# Patient Record
Sex: FEMALE | Race: BLACK OR AFRICAN AMERICAN | NOT HISPANIC OR LATINO
[De-identification: names, ages, dates, MRNs, and addresses within clinical notes are randomized per-mention and may not be internally consistent; named-entity substitution may affect disease eponyms.]

---

## 2021-09-09 PROBLEM — Z00.00 ENCOUNTER FOR PREVENTIVE HEALTH EXAMINATION: Status: ACTIVE | Noted: 2021-09-09

## 2021-09-10 ENCOUNTER — APPOINTMENT (OUTPATIENT)
Dept: VASCULAR SURGERY | Facility: CLINIC | Age: 76
End: 2021-09-10
Payer: MEDICARE

## 2021-09-10 VITALS
HEART RATE: 71 BPM | SYSTOLIC BLOOD PRESSURE: 157 MMHG | WEIGHT: 176 LBS | HEIGHT: 63 IN | DIASTOLIC BLOOD PRESSURE: 75 MMHG | BODY MASS INDEX: 31.18 KG/M2

## 2021-09-10 DIAGNOSIS — I10 ESSENTIAL (PRIMARY) HYPERTENSION: ICD-10-CM

## 2021-09-10 DIAGNOSIS — J45.909 UNSPECIFIED ASTHMA, UNCOMPLICATED: ICD-10-CM

## 2021-09-10 DIAGNOSIS — I82.409 ACUTE EMBOLISM AND THROMBOSIS OF UNSPECIFIED DEEP VEINS OF UNSPECIFIED LOWER EXTREMITY: ICD-10-CM

## 2021-09-10 DIAGNOSIS — Z78.9 OTHER SPECIFIED HEALTH STATUS: ICD-10-CM

## 2021-09-10 DIAGNOSIS — E11.9 TYPE 2 DIABETES MELLITUS W/OUT COMPLICATIONS: ICD-10-CM

## 2021-09-10 DIAGNOSIS — Z80.9 FAMILY HISTORY OF MALIGNANT NEOPLASM, UNSPECIFIED: ICD-10-CM

## 2021-09-10 PROCEDURE — 99204 OFFICE O/P NEW MOD 45 MIN: CPT

## 2021-09-10 NOTE — CONSULT LETTER
[Dear  ___] : Dear  [unfilled], [Consult Letter:] : I had the pleasure of evaluating your patient, [unfilled]. [Please see my note below.] : Please see my note below. [Consult Closing:] : Thank you very much for allowing me to participate in the care of this patient.  If you have any questions, please do not hesitate to contact me. [Sincerely,] : Sincerely, [FreeTextEntry3] : Gabino Del Rosario M.D., F.CASTILLOS., R.P.JANINE.I.\par  of Vascular Surgery\par Assistant Professor of Radiology\par Director of Endovascular Program/ [facility]\par [facility]

## 2021-09-10 NOTE — ASSESSMENT
[FreeTextEntry1] : Patient with left lower extremity DVT being treated with anticoagulation.  Venous duplex from last week shows left popliteal and femoral DVT.  Continue anticoagulation for 6 to 12 months.  We will follow-up in 6 months with repeat duplex.  Recommend compression stockings and elevation to treat edema.\par \par I spoke to the patient regarding screening for malignancy and follow-up for mammography.

## 2021-09-10 NOTE — HISTORY OF PRESENT ILLNESS
[FreeTextEntry1] : Patient is a 76-year-old female with past medical history significant for hypertension who in June 2021 was diagnosed with left lower extremity extensive DVT with pulmonary embolism.  Patient was admitted to the hospital and was treated with anticoagulation.  Patient was discharged home on Eliquis.  Patient is here for follow-up.  No complaint of chest pain or shortness of breath.  Patient reports left lower extremity edema.  Patient has no history of malignancy or smoking and is not on birth control pills.\par \par Patient was traveling at the time and the episode was immediately after returning to [place].  Patient's last mammogram was 2 years ago and she had a colonoscopy 2 years ago also.

## 2021-09-10 NOTE — PHYSICAL EXAM
[JVD] : no jugular venous distention  [Normal Breath Sounds] : Normal breath sounds [Normal Heart Sounds] : normal heart sounds [2+] : left 2+ [Ankle Swelling (On Exam)] : present [Ankle Swelling On The Left] : moderate [Abdomen Masses] : No abdominal masses

## 2021-12-17 ENCOUNTER — APPOINTMENT (OUTPATIENT)
Dept: VASCULAR SURGERY | Facility: CLINIC | Age: 76
End: 2021-12-17
Payer: MEDICARE

## 2021-12-17 VITALS
BODY MASS INDEX: 31.18 KG/M2 | WEIGHT: 176 LBS | DIASTOLIC BLOOD PRESSURE: 82 MMHG | SYSTOLIC BLOOD PRESSURE: 159 MMHG | HEART RATE: 79 BPM | HEIGHT: 63 IN

## 2021-12-17 VITALS — TEMPERATURE: 97.2 F

## 2021-12-17 PROCEDURE — 93970 EXTREMITY STUDY: CPT

## 2021-12-17 PROCEDURE — 99214 OFFICE O/P EST MOD 30 MIN: CPT

## 2021-12-17 NOTE — HISTORY OF PRESENT ILLNESS
[FreeTextEntry1] : Patient is a 76-year-old female with past medical history significant for hypertension who in June 2021 was diagnosed with left lower extremity extensive DVT with pulmonary embolism.  Patient was admitted to the hospital and was treated with anticoagulation.  Patient was discharged home on Eliquis.  Patient is here for follow-up.  No complaint of chest pain or shortness of breath.  Patient reports left lower extremity edema.  Patient has no history of malignancy or smoking and is not on birth control pills.\par \par

## 2021-12-17 NOTE — ASSESSMENT
[FreeTextEntry1] : Patient with left lower extremity DVT being treated with anticoagulation.  \par \par No complaint of chest pain or shortness of breath.  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  This was discussed with the patient.  Follow-up in 6 months.

## 2022-01-11 ENCOUNTER — RESULT REVIEW (OUTPATIENT)
Age: 77
End: 2022-01-11

## 2022-06-03 ENCOUNTER — APPOINTMENT (OUTPATIENT)
Dept: VASCULAR SURGERY | Facility: CLINIC | Age: 77
End: 2022-06-03
Payer: MEDICARE

## 2022-06-03 PROCEDURE — 99214 OFFICE O/P EST MOD 30 MIN: CPT

## 2022-06-03 PROCEDURE — 93971 EXTREMITY STUDY: CPT

## 2022-06-03 RX ORDER — METFORMIN HYDROCHLORIDE 500 MG/1
500 TABLET, COATED ORAL
Qty: 180 | Refills: 0 | Status: ACTIVE | COMMUNITY
Start: 2021-12-16

## 2022-06-03 RX ORDER — APIXABAN 5 MG/1
5 TABLET, FILM COATED ORAL
Qty: 180 | Refills: 0 | Status: ACTIVE | COMMUNITY
Start: 2021-07-02

## 2022-06-03 RX ORDER — LOSARTAN POTASSIUM 100 MG/1
100 TABLET, FILM COATED ORAL
Qty: 90 | Refills: 0 | Status: ACTIVE | COMMUNITY
Start: 2021-12-16

## 2022-06-03 RX ORDER — AMLODIPINE BESYLATE 10 MG/1
10 TABLET ORAL
Qty: 90 | Refills: 0 | Status: ACTIVE | COMMUNITY
Start: 2021-12-16

## 2022-06-03 RX ORDER — GLIMEPIRIDE 2 MG/1
2 TABLET ORAL
Qty: 90 | Refills: 0 | Status: ACTIVE | COMMUNITY
Start: 2021-12-16

## 2022-06-03 RX ORDER — CHLORTHALIDONE 25 MG/1
25 TABLET ORAL
Qty: 90 | Refills: 0 | Status: ACTIVE | COMMUNITY
Start: 2022-03-31

## 2022-06-03 RX ORDER — ATORVASTATIN CALCIUM 40 MG/1
40 TABLET, FILM COATED ORAL
Qty: 90 | Refills: 0 | Status: ACTIVE | COMMUNITY
Start: 2021-12-16

## 2022-06-03 RX ORDER — ALENDRONATE SODIUM 70 MG/1
70 TABLET ORAL
Qty: 12 | Refills: 0 | Status: ACTIVE | COMMUNITY
Start: 2021-12-16

## 2022-06-03 RX ORDER — BLOOD-GLUCOSE METER
W/DEVICE KIT MISCELLANEOUS
Qty: 1 | Refills: 0 | Status: ACTIVE | COMMUNITY
Start: 2022-03-03

## 2022-06-03 RX ORDER — LANCETS 28 GAUGE
EACH MISCELLANEOUS
Qty: 200 | Refills: 0 | Status: ACTIVE | COMMUNITY
Start: 2022-03-03

## 2022-06-03 RX ORDER — BLOOD SUGAR DIAGNOSTIC
STRIP MISCELLANEOUS
Qty: 200 | Refills: 0 | Status: ACTIVE | COMMUNITY
Start: 2022-03-03

## 2022-06-03 RX ORDER — EZETIMIBE 10 MG/1
10 TABLET ORAL
Qty: 90 | Refills: 0 | Status: ACTIVE | COMMUNITY
Start: 2021-12-16

## 2022-06-03 NOTE — ASSESSMENT
[FreeTextEntry1] : Patient with left lower extremity DVT being treated with anticoagulation.  \par \par No complaint of chest pain or shortness of breath.  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  At this time all the thrombus in the left leg is chronic with recanalization.  Patient should continue with Eliquis until he is further evaluated by the hematology service.\par \par Follow-up as needed.

## 2023-04-14 ENCOUNTER — INPATIENT (INPATIENT)
Facility: HOSPITAL | Age: 78
LOS: 3 days | Discharge: HOME | DRG: 690 | End: 2023-04-18
Attending: STUDENT IN AN ORGANIZED HEALTH CARE EDUCATION/TRAINING PROGRAM | Admitting: STUDENT IN AN ORGANIZED HEALTH CARE EDUCATION/TRAINING PROGRAM
Payer: COMMERCIAL

## 2023-04-14 VITALS
TEMPERATURE: 98 F | HEART RATE: 83 BPM | SYSTOLIC BLOOD PRESSURE: 147 MMHG | RESPIRATION RATE: 17 BRPM | WEIGHT: 173.06 LBS | DIASTOLIC BLOOD PRESSURE: 72 MMHG | HEIGHT: 63 IN | OXYGEN SATURATION: 96 %

## 2023-04-14 DIAGNOSIS — Z29.9 ENCOUNTER FOR PROPHYLACTIC MEASURES, UNSPECIFIED: ICD-10-CM

## 2023-04-14 DIAGNOSIS — E87.6 HYPOKALEMIA: ICD-10-CM

## 2023-04-14 DIAGNOSIS — N39.0 URINARY TRACT INFECTION, SITE NOT SPECIFIED: ICD-10-CM

## 2023-04-14 DIAGNOSIS — E11.9 TYPE 2 DIABETES MELLITUS WITHOUT COMPLICATIONS: ICD-10-CM

## 2023-04-14 DIAGNOSIS — Z98.891 HISTORY OF UTERINE SCAR FROM PREVIOUS SURGERY: Chronic | ICD-10-CM

## 2023-04-14 DIAGNOSIS — E78.5 HYPERLIPIDEMIA, UNSPECIFIED: ICD-10-CM

## 2023-04-14 DIAGNOSIS — Z86.718 PERSONAL HISTORY OF OTHER VENOUS THROMBOSIS AND EMBOLISM: ICD-10-CM

## 2023-04-14 DIAGNOSIS — I10 ESSENTIAL (PRIMARY) HYPERTENSION: ICD-10-CM

## 2023-04-14 LAB
ALBUMIN SERPL ELPH-MCNC: 3.7 G/DL — SIGNIFICANT CHANGE UP (ref 3.5–5)
ALP SERPL-CCNC: 68 U/L — SIGNIFICANT CHANGE UP (ref 40–120)
ALT FLD-CCNC: 17 U/L DA — SIGNIFICANT CHANGE UP (ref 10–60)
ANION GAP SERPL CALC-SCNC: 4 MMOL/L — LOW (ref 5–17)
APPEARANCE UR: CLEAR — SIGNIFICANT CHANGE UP
APTT BLD: 38.7 SEC — HIGH (ref 27.5–35.5)
AST SERPL-CCNC: 19 U/L — SIGNIFICANT CHANGE UP (ref 10–40)
BACTERIA # UR AUTO: ABNORMAL /HPF
BASOPHILS # BLD AUTO: 0.04 K/UL — SIGNIFICANT CHANGE UP (ref 0–0.2)
BASOPHILS NFR BLD AUTO: 0.8 % — SIGNIFICANT CHANGE UP (ref 0–2)
BILIRUB SERPL-MCNC: 0.9 MG/DL — SIGNIFICANT CHANGE UP (ref 0.2–1.2)
BILIRUB UR-MCNC: NEGATIVE — SIGNIFICANT CHANGE UP
BUN SERPL-MCNC: 11 MG/DL — SIGNIFICANT CHANGE UP (ref 7–18)
CALCIUM SERPL-MCNC: 9.8 MG/DL — SIGNIFICANT CHANGE UP (ref 8.4–10.5)
CHLORIDE SERPL-SCNC: 100 MMOL/L — SIGNIFICANT CHANGE UP (ref 96–108)
CO2 SERPL-SCNC: 31 MMOL/L — SIGNIFICANT CHANGE UP (ref 22–31)
COLOR SPEC: YELLOW — SIGNIFICANT CHANGE UP
CREAT SERPL-MCNC: 0.83 MG/DL — SIGNIFICANT CHANGE UP (ref 0.5–1.3)
DIFF PNL FLD: ABNORMAL
EGFR: 72 ML/MIN/1.73M2 — SIGNIFICANT CHANGE UP
EOSINOPHIL # BLD AUTO: 0.06 K/UL — SIGNIFICANT CHANGE UP (ref 0–0.5)
EOSINOPHIL NFR BLD AUTO: 1.2 % — SIGNIFICANT CHANGE UP (ref 0–6)
EPI CELLS # UR: ABNORMAL /HPF
GLUCOSE BLDC GLUCOMTR-MCNC: 185 MG/DL — HIGH (ref 70–99)
GLUCOSE SERPL-MCNC: 113 MG/DL — HIGH (ref 70–99)
GLUCOSE UR QL: NEGATIVE — SIGNIFICANT CHANGE UP
HCT VFR BLD CALC: 43.3 % — SIGNIFICANT CHANGE UP (ref 34.5–45)
HGB BLD-MCNC: 13.3 G/DL — SIGNIFICANT CHANGE UP (ref 11.5–15.5)
IMM GRANULOCYTES NFR BLD AUTO: 0.2 % — SIGNIFICANT CHANGE UP (ref 0–0.9)
INR BLD: 1.44 RATIO — HIGH (ref 0.88–1.16)
KETONES UR-MCNC: ABNORMAL
LEUKOCYTE ESTERASE UR-ACNC: ABNORMAL
LYMPHOCYTES # BLD AUTO: 1.75 K/UL — SIGNIFICANT CHANGE UP (ref 1–3.3)
LYMPHOCYTES # BLD AUTO: 34.8 % — SIGNIFICANT CHANGE UP (ref 13–44)
MCHC RBC-ENTMCNC: 24.7 PG — LOW (ref 27–34)
MCHC RBC-ENTMCNC: 30.7 GM/DL — LOW (ref 32–36)
MCV RBC AUTO: 80.5 FL — SIGNIFICANT CHANGE UP (ref 80–100)
MONOCYTES # BLD AUTO: 0.3 K/UL — SIGNIFICANT CHANGE UP (ref 0–0.9)
MONOCYTES NFR BLD AUTO: 6 % — SIGNIFICANT CHANGE UP (ref 2–14)
NEUTROPHILS # BLD AUTO: 2.87 K/UL — SIGNIFICANT CHANGE UP (ref 1.8–7.4)
NEUTROPHILS NFR BLD AUTO: 57 % — SIGNIFICANT CHANGE UP (ref 43–77)
NITRITE UR-MCNC: NEGATIVE — SIGNIFICANT CHANGE UP
NRBC # BLD: 0 /100 WBCS — SIGNIFICANT CHANGE UP (ref 0–0)
PH UR: 8 — SIGNIFICANT CHANGE UP (ref 5–8)
PLATELET # BLD AUTO: 361 K/UL — SIGNIFICANT CHANGE UP (ref 150–400)
POTASSIUM SERPL-MCNC: 3.4 MMOL/L — LOW (ref 3.5–5.3)
POTASSIUM SERPL-SCNC: 3.4 MMOL/L — LOW (ref 3.5–5.3)
PROT SERPL-MCNC: 8.9 G/DL — HIGH (ref 6–8.3)
PROT UR-MCNC: 15 MG/DL
PROTHROM AB SERPL-ACNC: 17.2 SEC — HIGH (ref 10.5–13.4)
RBC # BLD: 5.38 M/UL — HIGH (ref 3.8–5.2)
RBC # FLD: 13 % — SIGNIFICANT CHANGE UP (ref 10.3–14.5)
RBC CASTS # UR COMP ASSIST: ABNORMAL /HPF (ref 0–2)
SARS-COV-2 RNA SPEC QL NAA+PROBE: SIGNIFICANT CHANGE UP
SODIUM SERPL-SCNC: 135 MMOL/L — SIGNIFICANT CHANGE UP (ref 135–145)
SP GR SPEC: 1.01 — SIGNIFICANT CHANGE UP (ref 1.01–1.02)
UROBILINOGEN FLD QL: 1 MG/DL
WBC # BLD: 5.03 K/UL — SIGNIFICANT CHANGE UP (ref 3.8–10.5)
WBC # FLD AUTO: 5.03 K/UL — SIGNIFICANT CHANGE UP (ref 3.8–10.5)
WBC UR QL: SIGNIFICANT CHANGE UP /HPF (ref 0–5)

## 2023-04-14 PROCEDURE — 99285 EMERGENCY DEPT VISIT HI MDM: CPT

## 2023-04-14 PROCEDURE — 99223 1ST HOSP IP/OBS HIGH 75: CPT | Mod: GC

## 2023-04-14 RX ORDER — APIXABAN 2.5 MG/1
1 TABLET, FILM COATED ORAL
Refills: 0 | DISCHARGE

## 2023-04-14 RX ORDER — ATORVASTATIN CALCIUM 80 MG/1
40 TABLET, FILM COATED ORAL AT BEDTIME
Refills: 0 | Status: DISCONTINUED | OUTPATIENT
Start: 2023-04-14 | End: 2023-04-18

## 2023-04-14 RX ORDER — MEROPENEM 1 G/30ML
1000 INJECTION INTRAVENOUS ONCE
Refills: 0 | Status: COMPLETED | OUTPATIENT
Start: 2023-04-14 | End: 2023-04-14

## 2023-04-14 RX ORDER — NITROFURANTOIN MACROCRYSTAL 50 MG
1 CAPSULE ORAL
Refills: 0 | DISCHARGE

## 2023-04-14 RX ORDER — EZETIMIBE 10 MG/1
1 TABLET ORAL
Refills: 0 | DISCHARGE

## 2023-04-14 RX ORDER — APIXABAN 2.5 MG/1
5 TABLET, FILM COATED ORAL EVERY 12 HOURS
Refills: 0 | Status: DISCONTINUED | OUTPATIENT
Start: 2023-04-15 | End: 2023-04-18

## 2023-04-14 RX ORDER — AMLODIPINE BESYLATE 2.5 MG/1
1 TABLET ORAL
Refills: 0 | DISCHARGE

## 2023-04-14 RX ORDER — LOSARTAN POTASSIUM 100 MG/1
100 TABLET, FILM COATED ORAL DAILY
Refills: 0 | Status: DISCONTINUED | OUTPATIENT
Start: 2023-04-14 | End: 2023-04-18

## 2023-04-14 RX ORDER — MEROPENEM 1 G/30ML
1000 INJECTION INTRAVENOUS EVERY 8 HOURS
Refills: 0 | Status: DISCONTINUED | OUTPATIENT
Start: 2023-04-15 | End: 2023-04-15

## 2023-04-14 RX ORDER — AMLODIPINE BESYLATE 2.5 MG/1
10 TABLET ORAL DAILY
Refills: 0 | Status: DISCONTINUED | OUTPATIENT
Start: 2023-04-14 | End: 2023-04-18

## 2023-04-14 RX ORDER — GLIMEPIRIDE 1 MG
1 TABLET ORAL
Refills: 0 | DISCHARGE

## 2023-04-14 RX ORDER — LOSARTAN POTASSIUM 100 MG/1
1 TABLET, FILM COATED ORAL
Refills: 0 | DISCHARGE

## 2023-04-14 RX ORDER — ACETAMINOPHEN 500 MG
650 TABLET ORAL EVERY 6 HOURS
Refills: 0 | Status: DISCONTINUED | OUTPATIENT
Start: 2023-04-14 | End: 2023-04-18

## 2023-04-14 RX ORDER — ATORVASTATIN CALCIUM 80 MG/1
1 TABLET, FILM COATED ORAL
Refills: 0 | DISCHARGE

## 2023-04-14 RX ORDER — METFORMIN HYDROCHLORIDE 850 MG/1
1 TABLET ORAL
Refills: 0 | DISCHARGE

## 2023-04-14 RX ORDER — POTASSIUM CHLORIDE 20 MEQ
40 PACKET (EA) ORAL ONCE
Refills: 0 | Status: COMPLETED | OUTPATIENT
Start: 2023-04-14 | End: 2023-04-14

## 2023-04-14 RX ORDER — INSULIN LISPRO 100/ML
VIAL (ML) SUBCUTANEOUS
Refills: 0 | Status: DISCONTINUED | OUTPATIENT
Start: 2023-04-14 | End: 2023-04-18

## 2023-04-14 RX ORDER — MEROPENEM 1 G/30ML
INJECTION INTRAVENOUS
Refills: 0 | Status: DISCONTINUED | OUTPATIENT
Start: 2023-04-14 | End: 2023-04-15

## 2023-04-14 RX ORDER — ENOXAPARIN SODIUM 100 MG/ML
40 INJECTION SUBCUTANEOUS EVERY 24 HOURS
Refills: 0 | Status: DISCONTINUED | OUTPATIENT
Start: 2023-04-14 | End: 2023-04-14

## 2023-04-14 RX ADMIN — ATORVASTATIN CALCIUM 40 MILLIGRAM(S): 80 TABLET, FILM COATED ORAL at 22:13

## 2023-04-14 RX ADMIN — Medication 40 MILLIEQUIVALENT(S): at 23:02

## 2023-04-14 RX ADMIN — MEROPENEM 100 MILLIGRAM(S): 1 INJECTION INTRAVENOUS at 18:34

## 2023-04-14 RX ADMIN — Medication 1: at 22:13

## 2023-04-14 NOTE — H&P ADULT - ASSESSMENT
Patient is a 77 yo female with hx of HTN, DM, PE/DVT (in 2020 2/2 COVID vaccine, on Eliquis), HLD presents with dysuria. Patient sent from Dr. Faiza Higgins's office due to abnormal culture results (does not have it at bedside). Upon evaluation in ED, patient afebrile and hemodynamically stable. Patient found to have left flank pain on examination. Labs showing no leukocytosis, UA noted with no WBCs Potassium of 3.4. EKG NSR. Patient admitted to medicine for UTI.

## 2023-04-14 NOTE — H&P ADULT - ATTENDING COMMENTS
History as noted above.    Patient with chronic dysuria for several months and recently diagnosed with UTI on Macrobid. She was recalled today for resistant UTI, though the results were not brought in with her. We have no idea what to treat her with or what she was given, but per ED attending was told to give carbapenem by the office? She follows with Faiza Higgins. Patient otherwise appearing non-toxic.    VSS    Labs personally reviewed  No leukocytosis  No pyuria  Normal renal function    For now we have no choice but to continue meropenem in case she has ESBL. I will attempt to obtain urine culture results. If outpatient provider does want to treat for UTI plan for 7 day course and likely will require midline. Ertapenem would be easier for dosing. Given overall presentation may have to consider chronic pelvic pain/dysuria as possibility.

## 2023-04-14 NOTE — ED PROVIDER NOTE - OBJECTIVE STATEMENT
79 yo F with pmhx of dm, htn presents with few days of dysuria. No fever, chills, abd pain, n/v, vaginal itching/discharge. States she was seen by her obgyn Dr. Faiza Higgins - who started her on nitrofurantin. She received a call today in the after and was told to come to ED to have her abx changed based on cx. Pt does not have ucx results with her however. Attempted to reach out to her physician  -

## 2023-04-14 NOTE — ED PROVIDER NOTE - PROGRESS NOTE DETAILS
received a call from ob team - pt tested positive for kleb pneumia that is only susceptible to  meropenem.   primary care physician Dr. Matilde Ruano

## 2023-04-14 NOTE — H&P ADULT - NSICDXPASTMEDICALHX_GEN_ALL_CORE_FT
PAST MEDICAL HISTORY:  DM (diabetes mellitus)     DVT, lower extremity     HLD (hyperlipidemia)     HTN (hypertension)     Pulmonary embolism

## 2023-04-14 NOTE — H&P ADULT - HISTORY OF PRESENT ILLNESS
Patient is a 77 yo female with hx of HTN, DM, PE/DVT (in 2020 2/2 COVID vaccine, on Eliquis), HLD presents with dysuria. Patient states that she has been having dysuria since last November, when she returned to her home [place] [facility]. Patient states that the dysuria was intermittent, but started to become worse on April 6. This last Thursday, patient went to see Dr. Faiza Higgins and at the time was told she had a urinary tract infection. Patient was given Nitrofurantoin for ten days. Patient then received call from office stating that she has to come in due to urine culture results. Patient does not have urine culture results or has no knowledge of them. Patient denies fevers or chills. Denies abdominal pain, changes to bowel habits, nausea or vomiting. Denies chest pain, sob or lower extremity edema. Patient does report some left flank pain,

## 2023-04-14 NOTE — H&P ADULT - NSHPPHYSICALEXAM_GEN_ALL_CORE
PHYSICAL EXAM:  GENERAL: NAD, speaks in full sentences, no signs of respiratory distress  HEAD:  Atraumatic, Normocephalic  EYES: EOMI, PERRLA, conjunctiva and sclera clear  NECK: Supple,   CHEST/LUNG: Clear to auscultation bilaterally; No wheeze; No crackles; No accessory muscles used  HEART: Regular rate and rhythm; No murmurs; gallops or rubs   ABDOMEN: Soft, Nontender, Nondistended; Bowel sounds present; No guarding  EXTREMITIES:  2+ Peripheral Pulses, No cyanosis or edema  BACK: Left flank pain noted   PSYCH: AAOx3  NEUROLOGY: non-focal  SKIN: No rashes or lesions Vital Signs Last 24 Hrs  T(C): 36.8 (14 Apr 2023 23:29), Max: 36.9 (14 Apr 2023 15:59)  T(F): 98.2 (14 Apr 2023 23:29), Max: 98.4 (14 Apr 2023 15:59)  HR: 63 (14 Apr 2023 23:29) (63 - 83)  BP: 126/69 (14 Apr 2023 23:29) (126/69 - 147/72)  BP(mean): 88 (14 Apr 2023 23:29) (88 - 88)  RR: 18 (14 Apr 2023 23:29) (17 - 18)  SpO2: 96% (14 Apr 2023 23:29) (96% - 97%)    Parameters below as of 14 Apr 2023 23:29  Patient On (Oxygen Delivery Method): room air    PHYSICAL EXAM:  GENERAL: NAD, speaks in full sentences, no signs of respiratory distress  HEAD:  Atraumatic, Normocephalic  EYES: EOMI, PERRLA, conjunctiva and sclera clear  NECK: Supple,   CHEST/LUNG: Clear to auscultation bilaterally; No wheeze; No crackles; No accessory muscles used  HEART: Regular rate and rhythm; No murmurs; gallops or rubs   ABDOMEN: Soft, Nontender, Nondistended; Bowel sounds present; No guarding  EXTREMITIES:  2+ Peripheral Pulses, No cyanosis or edema  BACK: Left flank pain noted   PSYCH: AAOx3  NEUROLOGY: non-focal  SKIN: No rashes or lesions

## 2023-04-14 NOTE — H&P ADULT - NSHPREVIEWOFSYSTEMS_GEN_ALL_CORE
CONSTITUTIONAL: No changes in appetite or generalized weakness.  No fever, weight loss, or fatigue  EYES: No eye pain, visual disturbances, or discharge  ENT:  No difficulty hearing, tinnitus, vertigo; No sinus or throat pain  NECK: No pain or stiffness  RESPIRATORY: No cough, wheezing, chills or hemoptysis; No Shortness of Breath  CARDIOVASCULAR: No chest pain, palpitations, passing out, dizziness, or leg swelling  GASTROINTESTINAL: No abdominal or epigastric pain. No nausea, vomiting, or hematemesis; No diarrhea or constipation. No melena or hematochezia.  GENITOURINARY: Has dysuria, No frequency, hematuria, or incontinence  NEUROLOGICAL: No headaches, memory loss, loss of strength, numbness, or tremors  SKIN: NO skin lesions   LYMPH Nodes: No enlarged glands  ENDOCRINE: No heat or cold intolerance; No hair loss  MUSCULOSKELETAL: No joint pain or swelling; Has left flank pain  PSYCHIATRIC: No depression, anxiety, mood swings, or difficulty sleeping  HEME/LYMPH: No easy bruising, or bleeding gums  ALLERGY AND IMMUNOLOGIC: No hives or eczema

## 2023-04-14 NOTE — ED ADULT NURSE NOTE - OBJECTIVE STATEMENT
77 yo female sitting on a chair presents to the ED for medical evaluation for abnormal urine laboratory result as advised by OBGYN doctor.

## 2023-04-14 NOTE — ED PROVIDER NOTE - CLINICAL SUMMARY MEDICAL DECISION MAKING FREE TEXT BOX
79 yo F with pmhx of dm, htn presents with few days of dysuria. Concern for UTI - pt is already being treated with nitrofurantoin. Unable to reach her physician. No concern for pyelonephritis at this time. Will send urine culture and treat with cefuroxime 77 yo F with pmhx of dm, htn presents with few days of dysuria. Concern for UTI - pt is already being treated with nitrofurantoin. Unable to reach her physician.    d/w hospitalist agrees with admission for uti with kleb pneu that is only susceptible to meropenem

## 2023-04-14 NOTE — H&P ADULT - PROBLEM SELECTOR PLAN 1
- Patient presenting from outpatient provider for abnormal urine culture results requiring IV antibiotics  - symptoms of dysuria, flank pain on left side   - admit to medicine  - no leukocytosis, no WBCs on UA   - continue with meropenem (until culture results are obtained) for ESBL  - f/u urine and blood cultures

## 2023-04-15 LAB
ALBUMIN SERPL ELPH-MCNC: 3.2 G/DL — LOW (ref 3.5–5)
ALP SERPL-CCNC: 60 U/L — SIGNIFICANT CHANGE UP (ref 40–120)
ALT FLD-CCNC: 15 U/L DA — SIGNIFICANT CHANGE UP (ref 10–60)
ANION GAP SERPL CALC-SCNC: 5 MMOL/L — SIGNIFICANT CHANGE UP (ref 5–17)
AST SERPL-CCNC: 17 U/L — SIGNIFICANT CHANGE UP (ref 10–40)
BILIRUB SERPL-MCNC: 0.8 MG/DL — SIGNIFICANT CHANGE UP (ref 0.2–1.2)
BUN SERPL-MCNC: 11 MG/DL — SIGNIFICANT CHANGE UP (ref 7–18)
CALCIUM SERPL-MCNC: 9.2 MG/DL — SIGNIFICANT CHANGE UP (ref 8.4–10.5)
CHLORIDE SERPL-SCNC: 101 MMOL/L — SIGNIFICANT CHANGE UP (ref 96–108)
CO2 SERPL-SCNC: 32 MMOL/L — HIGH (ref 22–31)
CREAT SERPL-MCNC: 0.76 MG/DL — SIGNIFICANT CHANGE UP (ref 0.5–1.3)
CULTURE RESULTS: NO GROWTH — SIGNIFICANT CHANGE UP
EGFR: 80 ML/MIN/1.73M2 — SIGNIFICANT CHANGE UP
GLUCOSE BLDC GLUCOMTR-MCNC: 117 MG/DL — HIGH (ref 70–99)
GLUCOSE BLDC GLUCOMTR-MCNC: 119 MG/DL — HIGH (ref 70–99)
GLUCOSE BLDC GLUCOMTR-MCNC: 120 MG/DL — HIGH (ref 70–99)
GLUCOSE SERPL-MCNC: 100 MG/DL — HIGH (ref 70–99)
HCT VFR BLD CALC: 40.3 % — SIGNIFICANT CHANGE UP (ref 34.5–45)
HGB BLD-MCNC: 12.5 G/DL — SIGNIFICANT CHANGE UP (ref 11.5–15.5)
MAGNESIUM SERPL-MCNC: 2.3 MG/DL — SIGNIFICANT CHANGE UP (ref 1.6–2.6)
MCHC RBC-ENTMCNC: 25.1 PG — LOW (ref 27–34)
MCHC RBC-ENTMCNC: 31 GM/DL — LOW (ref 32–36)
MCV RBC AUTO: 80.8 FL — SIGNIFICANT CHANGE UP (ref 80–100)
NRBC # BLD: 0 /100 WBCS — SIGNIFICANT CHANGE UP (ref 0–0)
PHOSPHATE SERPL-MCNC: 3.3 MG/DL — SIGNIFICANT CHANGE UP (ref 2.5–4.5)
PLATELET # BLD AUTO: 331 K/UL — SIGNIFICANT CHANGE UP (ref 150–400)
POTASSIUM SERPL-MCNC: 3.8 MMOL/L — SIGNIFICANT CHANGE UP (ref 3.5–5.3)
POTASSIUM SERPL-SCNC: 3.8 MMOL/L — SIGNIFICANT CHANGE UP (ref 3.5–5.3)
PROT SERPL-MCNC: 7.9 G/DL — SIGNIFICANT CHANGE UP (ref 6–8.3)
RBC # BLD: 4.99 M/UL — SIGNIFICANT CHANGE UP (ref 3.8–5.2)
RBC # FLD: 13.2 % — SIGNIFICANT CHANGE UP (ref 10.3–14.5)
SODIUM SERPL-SCNC: 138 MMOL/L — SIGNIFICANT CHANGE UP (ref 135–145)
SPECIMEN SOURCE: SIGNIFICANT CHANGE UP
WBC # BLD: 4.63 K/UL — SIGNIFICANT CHANGE UP (ref 3.8–10.5)
WBC # FLD AUTO: 4.63 K/UL — SIGNIFICANT CHANGE UP (ref 3.8–10.5)

## 2023-04-15 PROCEDURE — 99232 SBSQ HOSP IP/OBS MODERATE 35: CPT

## 2023-04-15 PROCEDURE — 74176 CT ABD & PELVIS W/O CONTRAST: CPT | Mod: 26

## 2023-04-15 RX ORDER — ERTAPENEM SODIUM 1 G/1
1000 INJECTION, POWDER, LYOPHILIZED, FOR SOLUTION INTRAMUSCULAR; INTRAVENOUS EVERY 24 HOURS
Refills: 0 | Status: ACTIVE | OUTPATIENT
Start: 2023-04-16 | End: 2023-04-18

## 2023-04-15 RX ORDER — MEROPENEM 1 G/30ML
1000 INJECTION INTRAVENOUS EVERY 8 HOURS
Refills: 0 | Status: COMPLETED | OUTPATIENT
Start: 2023-04-15 | End: 2023-04-15

## 2023-04-15 RX ADMIN — MEROPENEM 100 MILLIGRAM(S): 1 INJECTION INTRAVENOUS at 21:21

## 2023-04-15 RX ADMIN — AMLODIPINE BESYLATE 10 MILLIGRAM(S): 2.5 TABLET ORAL at 05:34

## 2023-04-15 RX ADMIN — APIXABAN 5 MILLIGRAM(S): 2.5 TABLET, FILM COATED ORAL at 17:20

## 2023-04-15 RX ADMIN — MEROPENEM 100 MILLIGRAM(S): 1 INJECTION INTRAVENOUS at 05:33

## 2023-04-15 RX ADMIN — APIXABAN 5 MILLIGRAM(S): 2.5 TABLET, FILM COATED ORAL at 05:34

## 2023-04-15 RX ADMIN — ATORVASTATIN CALCIUM 40 MILLIGRAM(S): 80 TABLET, FILM COATED ORAL at 21:21

## 2023-04-15 RX ADMIN — LOSARTAN POTASSIUM 100 MILLIGRAM(S): 100 TABLET, FILM COATED ORAL at 05:34

## 2023-04-15 RX ADMIN — MEROPENEM 100 MILLIGRAM(S): 1 INJECTION INTRAVENOUS at 13:37

## 2023-04-15 NOTE — PATIENT PROFILE ADULT - FALL HARM RISK - HARM RISK INTERVENTIONS

## 2023-04-15 NOTE — CHART NOTE - NSCHARTNOTEFT_GEN_A_CORE
Urine Culture from outpatient 4/11 reviewed.    100K CFU of Klebsiella Pneumoniae    Likely ESBL and essentially sensitive to carbapenems (meropenem listed)    Will switch to Ertapenem for dosing ease. Plan for 5 day course for uncomplicated cystitis unless there is something abnormal on CT scan

## 2023-04-16 LAB
ALBUMIN SERPL ELPH-MCNC: 2.6 G/DL — LOW (ref 3.5–5)
ALP SERPL-CCNC: 49 U/L — SIGNIFICANT CHANGE UP (ref 40–120)
ALT FLD-CCNC: 13 U/L DA — SIGNIFICANT CHANGE UP (ref 10–60)
ANION GAP SERPL CALC-SCNC: 6 MMOL/L — SIGNIFICANT CHANGE UP (ref 5–17)
AST SERPL-CCNC: 12 U/L — SIGNIFICANT CHANGE UP (ref 10–40)
BILIRUB SERPL-MCNC: 0.7 MG/DL — SIGNIFICANT CHANGE UP (ref 0.2–1.2)
BUN SERPL-MCNC: 11 MG/DL — SIGNIFICANT CHANGE UP (ref 7–18)
CALCIUM SERPL-MCNC: 9.1 MG/DL — SIGNIFICANT CHANGE UP (ref 8.4–10.5)
CHLORIDE SERPL-SCNC: 105 MMOL/L — SIGNIFICANT CHANGE UP (ref 96–108)
CO2 SERPL-SCNC: 30 MMOL/L — SIGNIFICANT CHANGE UP (ref 22–31)
CREAT SERPL-MCNC: 0.72 MG/DL — SIGNIFICANT CHANGE UP (ref 0.5–1.3)
EGFR: 85 ML/MIN/1.73M2 — SIGNIFICANT CHANGE UP
GLUCOSE BLDC GLUCOMTR-MCNC: 108 MG/DL — HIGH (ref 70–99)
GLUCOSE BLDC GLUCOMTR-MCNC: 111 MG/DL — HIGH (ref 70–99)
GLUCOSE BLDC GLUCOMTR-MCNC: 122 MG/DL — HIGH (ref 70–99)
GLUCOSE BLDC GLUCOMTR-MCNC: 134 MG/DL — HIGH (ref 70–99)
GLUCOSE SERPL-MCNC: 112 MG/DL — HIGH (ref 70–99)
HCT VFR BLD CALC: 36.8 % — SIGNIFICANT CHANGE UP (ref 34.5–45)
HGB BLD-MCNC: 11.4 G/DL — LOW (ref 11.5–15.5)
MAGNESIUM SERPL-MCNC: 2.2 MG/DL — SIGNIFICANT CHANGE UP (ref 1.6–2.6)
MCHC RBC-ENTMCNC: 24.7 PG — LOW (ref 27–34)
MCHC RBC-ENTMCNC: 31 GM/DL — LOW (ref 32–36)
MCV RBC AUTO: 79.8 FL — LOW (ref 80–100)
NRBC # BLD: 0 /100 WBCS — SIGNIFICANT CHANGE UP (ref 0–0)
PHOSPHATE SERPL-MCNC: 3.2 MG/DL — SIGNIFICANT CHANGE UP (ref 2.5–4.5)
PLATELET # BLD AUTO: 274 K/UL — SIGNIFICANT CHANGE UP (ref 150–400)
POTASSIUM SERPL-MCNC: 3.4 MMOL/L — LOW (ref 3.5–5.3)
POTASSIUM SERPL-SCNC: 3.4 MMOL/L — LOW (ref 3.5–5.3)
PROT SERPL-MCNC: 6.6 G/DL — SIGNIFICANT CHANGE UP (ref 6–8.3)
RBC # BLD: 4.61 M/UL — SIGNIFICANT CHANGE UP (ref 3.8–5.2)
RBC # FLD: 13.2 % — SIGNIFICANT CHANGE UP (ref 10.3–14.5)
SODIUM SERPL-SCNC: 141 MMOL/L — SIGNIFICANT CHANGE UP (ref 135–145)
WBC # BLD: 3.73 K/UL — LOW (ref 3.8–10.5)
WBC # FLD AUTO: 3.73 K/UL — LOW (ref 3.8–10.5)

## 2023-04-16 PROCEDURE — 99232 SBSQ HOSP IP/OBS MODERATE 35: CPT | Mod: GC

## 2023-04-16 RX ORDER — POTASSIUM CHLORIDE 20 MEQ
40 PACKET (EA) ORAL ONCE
Refills: 0 | Status: COMPLETED | OUTPATIENT
Start: 2023-04-16 | End: 2023-04-16

## 2023-04-16 RX ADMIN — ATORVASTATIN CALCIUM 40 MILLIGRAM(S): 80 TABLET, FILM COATED ORAL at 21:48

## 2023-04-16 RX ADMIN — ERTAPENEM SODIUM 120 MILLIGRAM(S): 1 INJECTION, POWDER, LYOPHILIZED, FOR SOLUTION INTRAMUSCULAR; INTRAVENOUS at 05:21

## 2023-04-16 RX ADMIN — LOSARTAN POTASSIUM 100 MILLIGRAM(S): 100 TABLET, FILM COATED ORAL at 05:20

## 2023-04-16 RX ADMIN — AMLODIPINE BESYLATE 10 MILLIGRAM(S): 2.5 TABLET ORAL at 05:21

## 2023-04-16 RX ADMIN — APIXABAN 5 MILLIGRAM(S): 2.5 TABLET, FILM COATED ORAL at 17:50

## 2023-04-16 RX ADMIN — Medication 40 MILLIEQUIVALENT(S): at 08:37

## 2023-04-16 RX ADMIN — APIXABAN 5 MILLIGRAM(S): 2.5 TABLET, FILM COATED ORAL at 05:20

## 2023-04-16 NOTE — PROGRESS NOTE ADULT - NSPROGADDITIONALINFOA_GEN_ALL_CORE
A/P: HD#2 admitted for multidrug resistant urinary tract infection; urine culture 100K CFU of Klebsiella Pneumonia  -cont medicine mgmt, abx treatment Ertapenem x 5days  -urology consult outpatient recommended  -no active GYN management at this time, follow up with GYN outpatient, Dr Higgins in 1 week from discharge    Case and nst d/w Dr Higgins

## 2023-04-17 LAB
GLUCOSE BLDC GLUCOMTR-MCNC: 112 MG/DL — HIGH (ref 70–99)
GLUCOSE BLDC GLUCOMTR-MCNC: 124 MG/DL — HIGH (ref 70–99)
GLUCOSE BLDC GLUCOMTR-MCNC: 124 MG/DL — HIGH (ref 70–99)
GLUCOSE BLDC GLUCOMTR-MCNC: 97 MG/DL — SIGNIFICANT CHANGE UP (ref 70–99)

## 2023-04-17 PROCEDURE — 99232 SBSQ HOSP IP/OBS MODERATE 35: CPT

## 2023-04-17 RX ADMIN — APIXABAN 5 MILLIGRAM(S): 2.5 TABLET, FILM COATED ORAL at 17:03

## 2023-04-17 RX ADMIN — ATORVASTATIN CALCIUM 40 MILLIGRAM(S): 80 TABLET, FILM COATED ORAL at 21:02

## 2023-04-17 RX ADMIN — ERTAPENEM SODIUM 120 MILLIGRAM(S): 1 INJECTION, POWDER, LYOPHILIZED, FOR SOLUTION INTRAMUSCULAR; INTRAVENOUS at 05:17

## 2023-04-17 RX ADMIN — APIXABAN 5 MILLIGRAM(S): 2.5 TABLET, FILM COATED ORAL at 05:16

## 2023-04-17 NOTE — PROGRESS NOTE ADULT - ASSESSMENT
A/P: HD#2 admitted for multidrug resistant urinary tract infection; urine culture 100K CFU of Klebsiella Pneumonia  -cont medicine mgmt, abx treatment Ertapenem x 5days  -urology consult outpatient recommended  -no active GYN management at this time, follow up with GYN outpatient, Dr Higgins in 1 week from discharge    Case and nst d/w Dr Higgins 
A: HD#2 admitted   pt was seen by dr small-YUMIKO MAXWELL yesterday in her office and was instructed to come to the hospital for admission for multidrug resistant urinary tract infection and only sensitive to iv abx    P:  dr staci henley md requesting ID/urology consult   -ID for the duration of the iv abx   -urology due to pt has hx of kidney stone and recurrent UTI's  -thank you very much 
Patient is a 79 yo female with hx of HTN, DM, PE/DVT (in 2020 2/2 COVID vaccine, on Eliquis), HLD presents with dysuria. Patient states that she has been having dysuria since last November, when she returned to her home country [facility]. Patient states that the dysuria was intermittent, but started to become worse on April 6. This last Thursday, patient went to see Dr. Faiza Higgins and at the time was told she had a urinary tract infection. Patient was given Nitrofurantoin for ten days. Patient was instructed to come to the hospital for admission for multidrug resistant urinary tract infection. Patient admitted to medicine for UTI and was started on Meropenem. U. cx negative, Meropenem was changed to Invanz last dose 04/18.  
Patient is a 79 yo female with hx of HTN, DM, PE/DVT (in 2020 2/2 COVID vaccine, on Eliquis), HLD presents with dysuria. Patient sent from Dr. Faiza Higgins's office due to abnormal culture results (does not have it at bedside) requiring IV antibiotics.
Patient is a 77 yo female with hx of HTN, DM, PE/DVT (in 2020 2/2 COVID vaccine, on Eliquis), HLD presents with dysuria. Patient sent from Dr. Faiza Higgins's office due to abnormal culture results (does not have it at bedside) requiring IV antibiotics.

## 2023-04-17 NOTE — PROGRESS NOTE ADULT - PROBLEM SELECTOR PLAN 4
continue with home medications of losartan and amlodipine  - DASH diet.
- noted to be on Glimepiride and Metformin  - SSI
- noted to be on Glimepiride and Metformin  - SSI

## 2023-04-17 NOTE — PROGRESS NOTE ADULT - PROBLEM SELECTOR PLAN 1
Patient presenting from outpatient provider for abnormal urine culture results requiring IV antibiotics  - symptoms of dysuria, flank pain on left side   - admit to medicine  - no leukocytosis, no WBCs on UA   - continue with meropenem (until culture results are obtained) for ESBL  - urine and blood cultures negative.  - Meropenem changed to Invanz till 04/18
A/P: HD#2 admitted for multidrug resistant urinary tract infection; urine culture 100K CFU of Klebsiella Pneumonia  -cont medicine mgmt, abx treatment Ertapenem x 5days  -urology consult outpatient recommended  -no active GYN management at this time, follow up with GYN outpatient, Dr Higgins in 1 week from discharge    Case and nst d/w Dr Higgins
- Patient presenting from outpatient provider for abnormal urine culture results requiring IV antibiotics  - UCx reviewed: Klebsiella Pneumoniae likely ESBL (sensitivities reviewed)  - Plan for total treatment of 5 days for cystitis, Ertapenem 1 g daily (finishing treatment 4/18)  - blood & urine cultures NGTD  - CT [facility] [place] performed: no nephrolithiasis and likely stool ball seen  - Outpatient follow up with Faiza Higgins 1 week post discharge
- Patient presenting from outpatient provider for abnormal urine culture results requiring IV antibiotics  - Will try to obtain the culture results from [facility]. Discussed with GYN PA about the need to review the actual results  - For now will provide Meropenem  - Plan for total 5-7 day course  - Follow up blood & urine cultures  - Given prior history of nephrolithiasis will perform CT [facility]

## 2023-04-17 NOTE — PROGRESS NOTE ADULT - SUBJECTIVE AND OBJECTIVE BOX
S:  HD#2 admitted   pt was seen by dr small-GYN MD yesterday in her office and was instructed to come to the hospital for admission for multidrug resistant urinary tract infection    pt seen at bedside   still reports of dysuria when she urinates and urgency  denies abd pain or back pain  denies fever/chills    pt has hx of kidney stone in november and as per pt she was given medication that helped    O:  Vital Signs Last 24 Hrs  T(C): 37.1 (15 Apr 2023 06:28), Max: 37.1 (15 Apr 2023 06:28)  T(F): 98.7 (15 Apr 2023 06:28), Max: 98.7 (15 Apr 2023 06:28)  HR: 62 (15 Apr 2023 06:28) (62 - 83)  BP: 129/54 (15 Apr 2023 06:28) (126/69 - 148/78)  BP(mean): 62 (15 Apr 2023 05:23) (62 - 88)  RR: 16 (15 Apr 2023 06:28) (16 - 18)  SpO2: 97% (15 Apr 2023 06:28) (96% - 97%)    Parameters below as of 15 Apr 2023 06:28  Patient On (Oxygen Delivery Method): room air    pt skin warm dry good color  abd soft NT ND no guarding no rebound  no cvat b/l  pelvic no vag bleeding  extrem no edema b/l                          12.5   4.63  )-----------( 331      ( 15 Apr 2023 05:40 )             40.3   04-15    138  |  101  |  11  ----------------------------<  100<H>  3.8   |  32<H>  |  0.76    Ca    9.2      15 Apr 2023 05:40  Phos  3.3     04-15  Mg     2.3     04-15    TPro  7.9  /  Alb  3.2<L>  /  TBili  0.8  /  DBili  x   /  AST  17  /  ALT  15  /  AlkPhos  60  04-15    
Patient is a 78y old  Female who presents with a chief complaint of UTI (15 Apr 2023 11:38)      SUBJECTIVE / OVERNIGHT EVENTS: Patient seen and examined at bedside. No acute events overnight. Dysuria improving. Denies fever/chills or hematuria.    MEDICATIONS  (STANDING):  amLODIPine   Tablet 10 milliGRAM(s) Oral daily  apixaban 5 milliGRAM(s) Oral every 12 hours  atorvastatin 40 milliGRAM(s) Oral at bedtime  insulin lispro (ADMELOG) corrective regimen sliding scale   SubCutaneous three times a day before meals  losartan 100 milliGRAM(s) Oral daily  meropenem  IVPB 1000 milliGRAM(s) IV Intermittent every 8 hours    MEDICATIONS  (PRN):  acetaminophen     Tablet .. 650 milliGRAM(s) Oral every 6 hours PRN Temp greater or equal to 38C (100.4F), Mild Pain (1 - 3)      CAPILLARY BLOOD GLUCOSE      POCT Blood Glucose.: 120 mg/dL (15 Apr 2023 12:00)  POCT Blood Glucose.: 117 mg/dL (15 Apr 2023 08:06)  POCT Blood Glucose.: 185 mg/dL (2023 22:04)    I&O's Summary      PHYSICAL EXAM:  Vital Signs Last 24 Hrs  T(C): 37.1 (15 Apr 2023 06:28), Max: 37.1 (15 Apr 2023 06:28)  T(F): 98.7 (15 Apr 2023 06:28), Max: 98.7 (15 Apr 2023 06:28)  HR: 62 (15 Apr 2023 06:28) (62 - 83)  BP: 129/54 (15 Apr 2023 06:28) (126/69 - 148/78)  BP(mean): 62 (15 Apr 2023 05:23) (62 - 88)  RR: 16 (15 Apr 2023 06:28) (16 - 18)  SpO2: 97% (15 Apr 2023 06:28) (96% - 97%)    Parameters below as of 15 Apr 2023 06:28  Patient On (Oxygen Delivery Method): room air        GEN: female in NAD, appears comfortable, no diaphoresis  EYES: No scleral injection, PERRL, EOMI  ENTM: neck supple & symmetric without tracheal deviation, moist membranes, no gross hearing impairment, thyroid gland not enlarged  CV: +S1/S2, no m/r/g, no abdominal bruit, no LE edema  RESP: breathing comfortably, no respiratory accessory muscle use, CTAB, no w/r/r  GI: normoactive BS, soft, NTND, no rebounding/guarding, no palpable masses    LABS:                        12.5   4.63  )-----------( 331      ( 15 Apr 2023 05:40 )             40.3     04-15    138  |  101  |  11  ----------------------------<  100<H>  3.8   |  32<H>  |  0.76    Ca    9.2      15 Apr 2023 05:40  Phos  3.3     04-15  Mg     2.3     04-15    TPro  7.9  /  Alb  3.2<L>  /  TBili  0.8  /  DBili  x   /  AST  17  /  ALT  15  /  AlkPhos  60  04-15    PT/INR - ( 2023 18:10 )   PT: 17.2 sec;   INR: 1.44 ratio         PTT - ( 2023 18:10 )  PTT:38.7 sec      Urinalysis Basic - ( 2023 17:07 )    Color: Yellow / Appearance: Clear / S.015 / pH: x  Gluc: x / Ketone: Small  / Bili: Negative / Urobili: 1 mg/dL   Blood: x / Protein: 15 mg/dL / Nitrite: Negative   Leuk Esterase: Trace / RBC: 2-5 /HPF / WBC 0-2 /HPF   Sq Epi: x / Non Sq Epi: x / Bacteria: Few /HPF          RADIOLOGY & ADDITIONAL TESTS:  Results Reviewed:   Imaging Personally Reviewed:  Electrocardiogram Personally Reviewed:    COORDINATION OF CARE:  Care Discussed with Consultants/Other Providers [Y/N]:  Prior or Outpatient Records Reviewed [Y/N]:  
Patient seen at bedside resting comfortably offers no new complaints. Ambulating, voiding without difficulty. Denies N/V/D, dizziness, fever or chills.     Vital Signs Last 24 Hrs  T(C): 37 (16 Apr 2023 05:08), Max: 37.6 (15 Apr 2023 14:00)  T(F): 98.6 (16 Apr 2023 05:08), Max: 99.6 (15 Apr 2023 14:00)  HR: 61 (16 Apr 2023 05:08) (61 - 69)  BP: 133/99 (16 Apr 2023 05:08) (118/63 - 133/99)  RR: 19 (16 Apr 2023 05:08) (18 - 19)  SpO2: 95% (16 Apr 2023 05:08) (95% - 99%)    Parameters below as of 16 Apr 2023 05:08  Patient On (Oxygen Delivery Method): room air        Gen: A&O x 3, NAD  Chest: CTA B/L  Abdomen: +BS; soft; Nontender, nondistended                            11.4   3.73  )-----------( 274      ( 16 Apr 2023 07:31 )             36.8     04-16    141  |  105  |  11  ----------------------------<  112<H>  3.4<L>   |  30  |  0.72    Ca    9.1      16 Apr 2023 07:31  Phos  3.2     04-16  Mg     2.2     04-16    TPro  6.6  /  Alb  2.6<L>  /  TBili  0.7  /  DBili  x   /  AST  12  /  ALT  13  /  AlkPhos  49  04-16    < from: CT Renal Stone Hun (04.15.23 @ 13:27) >  PROCEDURE DATE:  04/15/2023          INTERPRETATION:  CLINICAL INFORMATION: Nephrolithiasis.    COMPARISON: None.    CONTRAST/COMPLICATIONS:  IV Contrast: NONE  Oral Contrast: NONE  Complications: None reported at time of study completion    PROCEDURE:  CT of the Abdomen and Pelvis was performed.  Sagittal and coronal reformats were performed.    FINDINGS:  LOWER CHEST: Small left atelectasis.    LIVER: Within normal limits.  BILE DUCTS: Normal caliber.  GALLBLADDER: Underdistended.  SPLEEN: Within normal limits.  PANCREAS: Atrophic.  ADRENALS: Nonspecific 1.0 cm left adrenal nodule with a Hounsfield unit   of 25. The right adrenal appears unremarkable.  KIDNEYS/URETERS: Within normal limits. No evidence for a calculus in the   kidneys or ureters. No hydronephrosis.    BLADDER: Within normal limits.  REPRODUCTIVE ORGANS: The uterus and left adnexa appear grossly   unremarkable. 1.3 cm cystic lesion in the right adnexa,suggestive of a   right ovarian cyst.    BOWEL: No bowel obstruction or grossly thickened bowel wall. Appendix   within normal limits. 2.7 cm soft tissue density structure in the cecum   may represent a stool ball, polyp, or cancer. If clinically indicated,   colonoscopy may be pursued for further evaluation. Moderate amount of   stool in the rectum.  PERITONEUM: No free air or ascites.  VESSELS: Calcified atherosclerotic disease.  RETROPERITONEUM/LYMPH NODES: No lymphadenopathy.  ABDOMINAL WALL:Small fat-containing umbilical hernia. Small   fat-containing left inguinal hernia.  BONES: Degenerative spondylosis.    IMPRESSION:  No evidence for a urinary calculus. No hydronephrosis.    2.7 cm soft tissue density structure in the cecum may represent a stool   ball, polyp, or cancer. If clinically indicated, colonoscopy may be   pursued for further evaluation.    --- End of Report ---            ARMANDO GAYTAN MD; Attending Radiologist  This document has been electronically signed. Apr 15 78071:53PM    < end of copied text >      A/P: HD#2 admitted for multidrug resistant urinary tract infection; urine culture 100K CFU of Klebsiella Pneumonia  -cont medicine mgmt, abx treatment Ertapenem x 5days  -urology consult outpatient recommended  -no active GYN management at this time, follow up with GYN outpatient, Dr Higgins in 1 week from discharge    Case and nst d/w Dr Higgins   
Patient is a 78y old  Female who presents with a chief complaint of UTI (2023 09:27)      SUBJECTIVE / OVERNIGHT EVENTS: Patient seen and examined at bedside. No acute events overnight. No dysuria/fever.    MEDICATIONS  (STANDING):  amLODIPine   Tablet 10 milliGRAM(s) Oral daily  apixaban 5 milliGRAM(s) Oral every 12 hours  atorvastatin 40 milliGRAM(s) Oral at bedtime  ertapenem  IVPB 1000 milliGRAM(s) IV Intermittent every 24 hours  insulin lispro (ADMELOG) corrective regimen sliding scale   SubCutaneous three times a day before meals  losartan 100 milliGRAM(s) Oral daily    MEDICATIONS  (PRN):  acetaminophen     Tablet .. 650 milliGRAM(s) Oral every 6 hours PRN Temp greater or equal to 38C (100.4F), Mild Pain (1 - 3)      CAPILLARY BLOOD GLUCOSE      POCT Blood Glucose.: 122 mg/dL (2023 11:43)  POCT Blood Glucose.: 108 mg/dL (2023 07:52)  POCT Blood Glucose.: 119 mg/dL (15 Apr 2023 16:55)  POCT Blood Glucose.: 120 mg/dL (15 Apr 2023 12:00)    I&O's Summary      PHYSICAL EXAM:  Vital Signs Last 24 Hrs  T(C): 37 (2023 05:08), Max: 37.6 (15 Apr 2023 14:00)  T(F): 98.6 (2023 05:08), Max: 99.6 (15 Apr 2023 14:00)  HR: 61 (2023 05:08) (61 - 69)  BP: 133/99 (2023 05:08) (118/63 - 133/99)  BP(mean): --  RR: 19 (2023 05:08) (18 - 19)  SpO2: 95% (2023 05:08) (95% - 99%)    Parameters below as of 2023 05:08  Patient On (Oxygen Delivery Method): room air        GEN: female in NAD, appears comfortable, no diaphoresis  EYES: No scleral injection, PERRL, EOMI  ENTM: neck supple & symmetric without tracheal deviation, moist membranes, no gross hearing impairment, thyroid gland not enlarged  CV: +S1/S2, no m/r/g, no abdominal bruit, no LE edema  RESP: breathing comfortably, no respiratory accessory muscle use, CTAB, no w/r/r  GI: normoactive BS, soft, NTND, no rebounding/guarding, no palpable masses    LABS:                        11.4   3.73  )-----------( 274      ( 2023 07:31 )             36.8     04-16    141  |  105  |  11  ----------------------------<  112<H>  3.4<L>   |  30  |  0.72    Ca    9.1      2023 07:31  Phos  3.2     04-16  Mg     2.2     04-16    TPro  6.6  /  Alb  2.6<L>  /  TBili  0.7  /  DBili  x   /  AST  12  /  ALT  13  /  AlkPhos  49  04-16    PT/INR - ( 2023 18:10 )   PT: 17.2 sec;   INR: 1.44 ratio         PTT - ( 2023 18:10 )  PTT:38.7 sec      Urinalysis Basic - ( 2023 17:07 )    Color: Yellow / Appearance: Clear / S.015 / pH: x  Gluc: x / Ketone: Small  / Bili: Negative / Urobili: 1 mg/dL   Blood: x / Protein: 15 mg/dL / Nitrite: Negative   Leuk Esterase: Trace / RBC: 2-5 /HPF / WBC 0-2 /HPF   Sq Epi: x / Non Sq Epi: x / Bacteria: Few /HPF        Culture - Blood (collected 2023 18:10)  Source: .Blood Blood-Peripheral  Preliminary Report (15 Apr 2023 22:02):    No growth to date.    Culture - Blood (collected 2023 18:00)  Source: .Blood Blood-Peripheral  Preliminary Report (15 Apr 2023 22:02):    No growth to date.    Culture - Urine (collected 2023 17:07)  Source: Clean Catch Clean Catch (Midstream)  Final Report (15 Apr 2023 20:01):    No growth        RADIOLOGY & ADDITIONAL TESTS:  Results Reviewed:   Imaging Personally Reviewed:  Electrocardiogram Personally Reviewed:    COORDINATION OF CARE:  Care Discussed with Consultants/Other Providers [Y/N]:  Prior or Outpatient Records Reviewed [Y/N]:  
NP Note discussed with Primary Provider      Patient is a 78y old  Female who presents with a chief complaint of UTI (16 Apr 2023 09:27)      INTERVAL HPI/OVERNIGHT EVENTS: no new complaints    MEDICATIONS  (STANDING):  amLODIPine   Tablet 10 milliGRAM(s) Oral daily  apixaban 5 milliGRAM(s) Oral every 12 hours  atorvastatin 40 milliGRAM(s) Oral at bedtime  ertapenem  IVPB 1000 milliGRAM(s) IV Intermittent every 24 hours  insulin lispro (ADMELOG) corrective regimen sliding scale   SubCutaneous three times a day before meals  losartan 100 milliGRAM(s) Oral daily    MEDICATIONS  (PRN):  acetaminophen     Tablet .. 650 milliGRAM(s) Oral every 6 hours PRN Temp greater or equal to 38C (100.4F), Mild Pain (1 - 3)      __________________________________________________  REVIEW OF SYSTEMS:    CONSTITUTIONAL: No fever,   EYES: no acute visual disturbances  NECK: No pain or stiffness  RESPIRATORY: No cough; No shortness of breath  CARDIOVASCULAR: No chest pain, no palpitations  GASTROINTESTINAL: No pain. No nausea or vomiting; No diarrhea   NEUROLOGICAL: No headache or numbness, no tremors  MUSCULOSKELETAL: No joint pain, no muscle pain  GENITOURINARY: no dysuria, no frequency, no hesitancy  PSYCHIATRY: no depression , no anxiety  ALL OTHER  ROS negative        Vital Signs Last 24 Hrs  T(C): 37.1 (17 Apr 2023 11:31), Max: 37.1 (17 Apr 2023 11:31)  T(F): 98.8 (17 Apr 2023 11:31), Max: 98.8 (17 Apr 2023 11:31)  HR: 64 (17 Apr 2023 11:31) (57 - 64)  BP: 115/74 (17 Apr 2023 11:31) (115/74 - 135/52)  BP(mean): --  RR: 14 (17 Apr 2023 11:31) (14 - 18)  SpO2: 100% (17 Apr 2023 11:31) (96% - 100%)    Parameters below as of 17 Apr 2023 11:31  Patient On (Oxygen Delivery Method): room air        ________________________________________________  PHYSICAL EXAM:  GENERAL: NAD  HEENT: Normocephalic;  conjunctivae and sclerae clear; moist mucous membranes;   NECK : supple  CHEST/LUNG: Clear to auscultation bilaterally with good air entry   HEART: S1 S2  regular; no murmurs, gallops or rubs  ABDOMEN: Soft, Nontender, Nondistended; Bowel sounds present  EXTREMITIES: no cyanosis; no edema; no calf tenderness  SKIN: warm and dry; no rash  NERVOUS SYSTEM:  Awake and alert; Oriented  to place, person and time ; no new deficits    _________________________________________________  LABS:                        11.4   3.73  )-----------( 274      ( 16 Apr 2023 07:31 )             36.8     04-16    141  |  105  |  11  ----------------------------<  112<H>  3.4<L>   |  30  |  0.72    Ca    9.1      16 Apr 2023 07:31  Phos  3.2     04-16  Mg     2.2     04-16    TPro  6.6  /  Alb  2.6<L>  /  TBili  0.7  /  DBili  x   /  AST  12  /  ALT  13  /  AlkPhos  49  04-16        CAPILLARY BLOOD GLUCOSE      POCT Blood Glucose.: 124 mg/dL (17 Apr 2023 11:56)  POCT Blood Glucose.: 97 mg/dL (17 Apr 2023 07:42)  POCT Blood Glucose.: 134 mg/dL (16 Apr 2023 20:59)  POCT Blood Glucose.: 111 mg/dL (16 Apr 2023 16:50)        RADIOLOGY & ADDITIONAL TESTS:    ACC: [id number] EXAM:  CT RENAL STONE NEWBERRY   ORDERED BY: JAI CHAVEZ     PROCEDURE DATE:  04/15/2023          INTERPRETATION:  CLINICAL INFORMATION: Nephrolithiasis.    COMPARISON: None.    CONTRAST/COMPLICATIONS:  IV Contrast: NONE  Oral Contrast: NONE  Complications: None reported at time of study completion    PROCEDURE:  CT of the Abdomen and Pelvis was performed.  Sagittal and coronal reformats were performed.    FINDINGS:  LOWER CHEST: Small left atelectasis.    LIVER: Within normal limits.  BILE DUCTS: Normal caliber.  GALLBLADDER: Underdistended.  SPLEEN: Within normal limits.  PANCREAS: Atrophic.  ADRENALS: Nonspecific 1.0 cm left adrenal nodule with a Hounsfield unit   of 25. The right adrenal appears unremarkable.  KIDNEYS/URETERS: Within normal limits. No evidence for a calculus in the   kidneys or ureters. No hydronephrosis.    BLADDER: Within normal limits.  REPRODUCTIVE ORGANS: The uterus and left adnexa appear grossly   unremarkable. 1.3 cm cystic lesion in the right adnexa,suggestive of a   right ovarian cyst.    BOWEL: No bowel obstruction or grossly thickened bowel wall. Appendix   within normal limits. 2.7 cm soft tissue density structure in the cecum   may represent a stool ball, polyp, or cancer. If clinically indicated,   colonoscopy may be pursued for further evaluation. Moderate amount of   stool in the rectum.  PERITONEUM: No free air or ascites.  VESSELS: Calcified atherosclerotic disease.  RETROPERITONEUM/LYMPH NODES: No lymphadenopathy.  ABDOMINAL WALL:Small fat-containing umbilical hernia. Small   fat-containing left inguinal hernia.  BONES: Degenerative spondylosis.    IMPRESSION:  No evidence for a urinary calculus. No hydronephrosis.    2.7 cm soft tissue density structure in the cecum may represent a stool   ball, polyp, or cancer. If clinically indicated, colonoscopy may be   pursued for further evaluation.    --- End of Report ---            ARMANDO GAYTAN MD; Attending Radiologist  This document has been electronically signed. Apr 15 52745:53PM    Imaging  Reviewed:  YES/NO    Consultant(s) Notes Reviewed:   YES/ No      Plan of care was discussed with patient and /or primary care giver; all questions and concerns were addressed

## 2023-04-17 NOTE — PROGRESS NOTE ADULT - NS ATTEND AMEND GEN_ALL_CORE FT
Patient seen and examined at bedside. No acute events overnight. No fever or dysuria. She will finish 5th day of antibiotics tomorrow for cystitis. She will follow up outpatient with Faiza Higgins. Discussed with patient and Gyn.

## 2023-04-17 NOTE — PROGRESS NOTE ADULT - PROBLEM SELECTOR PLAN 6
noted to have K of 3.4  - EKG NSR  - s/p 40meq KCl  - f/u CMP.
- continue with atorvastatin
- continue with atorvastatin

## 2023-04-17 NOTE — PROGRESS NOTE ADULT - PROBLEM SELECTOR PLAN 3
noted to be on Glimepiride and Metformin  - SSI.
- continue with home medications of losartan and amlodipine  - DASH diet
- continue with home medications of losartan and amlodipine  - DASH diet

## 2023-04-18 ENCOUNTER — TRANSCRIPTION ENCOUNTER (OUTPATIENT)
Age: 78
End: 2023-04-18

## 2023-04-18 VITALS
OXYGEN SATURATION: 98 % | HEART RATE: 68 BPM | RESPIRATION RATE: 15 BRPM | DIASTOLIC BLOOD PRESSURE: 75 MMHG | SYSTOLIC BLOOD PRESSURE: 138 MMHG | TEMPERATURE: 98 F

## 2023-04-18 LAB
ANION GAP SERPL CALC-SCNC: 5 MMOL/L — SIGNIFICANT CHANGE UP (ref 5–17)
BUN SERPL-MCNC: 13 MG/DL — SIGNIFICANT CHANGE UP (ref 7–18)
CALCIUM SERPL-MCNC: 8.6 MG/DL — SIGNIFICANT CHANGE UP (ref 8.4–10.5)
CHLORIDE SERPL-SCNC: 105 MMOL/L — SIGNIFICANT CHANGE UP (ref 96–108)
CO2 SERPL-SCNC: 30 MMOL/L — SIGNIFICANT CHANGE UP (ref 22–31)
CREAT SERPL-MCNC: 0.6 MG/DL — SIGNIFICANT CHANGE UP (ref 0.5–1.3)
EGFR: 91 ML/MIN/1.73M2 — SIGNIFICANT CHANGE UP
GLUCOSE BLDC GLUCOMTR-MCNC: 136 MG/DL — HIGH (ref 70–99)
GLUCOSE BLDC GLUCOMTR-MCNC: 97 MG/DL — SIGNIFICANT CHANGE UP (ref 70–99)
GLUCOSE SERPL-MCNC: 105 MG/DL — HIGH (ref 70–99)
HCT VFR BLD CALC: 36.9 % — SIGNIFICANT CHANGE UP (ref 34.5–45)
HGB BLD-MCNC: 11.3 G/DL — LOW (ref 11.5–15.5)
MCHC RBC-ENTMCNC: 24.9 PG — LOW (ref 27–34)
MCHC RBC-ENTMCNC: 30.6 GM/DL — LOW (ref 32–36)
MCV RBC AUTO: 81.5 FL — SIGNIFICANT CHANGE UP (ref 80–100)
MRSA PCR RESULT.: SIGNIFICANT CHANGE UP
NRBC # BLD: 0 /100 WBCS — SIGNIFICANT CHANGE UP (ref 0–0)
PLATELET # BLD AUTO: 295 K/UL — SIGNIFICANT CHANGE UP (ref 150–400)
POTASSIUM SERPL-MCNC: 3.5 MMOL/L — SIGNIFICANT CHANGE UP (ref 3.5–5.3)
POTASSIUM SERPL-SCNC: 3.5 MMOL/L — SIGNIFICANT CHANGE UP (ref 3.5–5.3)
RBC # BLD: 4.53 M/UL — SIGNIFICANT CHANGE UP (ref 3.8–5.2)
RBC # FLD: 13 % — SIGNIFICANT CHANGE UP (ref 10.3–14.5)
S AUREUS DNA NOSE QL NAA+PROBE: SIGNIFICANT CHANGE UP
SODIUM SERPL-SCNC: 140 MMOL/L — SIGNIFICANT CHANGE UP (ref 135–145)
WBC # BLD: 4.24 K/UL — SIGNIFICANT CHANGE UP (ref 3.8–10.5)
WBC # FLD AUTO: 4.24 K/UL — SIGNIFICANT CHANGE UP (ref 3.8–10.5)

## 2023-04-18 PROCEDURE — 85025 COMPLETE CBC W/AUTO DIFF WBC: CPT

## 2023-04-18 PROCEDURE — 36415 COLL VENOUS BLD VENIPUNCTURE: CPT

## 2023-04-18 PROCEDURE — 81001 URINALYSIS AUTO W/SCOPE: CPT

## 2023-04-18 PROCEDURE — 85610 PROTHROMBIN TIME: CPT

## 2023-04-18 PROCEDURE — 99285 EMERGENCY DEPT VISIT HI MDM: CPT | Mod: 25

## 2023-04-18 PROCEDURE — 87641 MR-STAPH DNA AMP PROBE: CPT

## 2023-04-18 PROCEDURE — 99239 HOSP IP/OBS DSCHRG MGMT >30: CPT

## 2023-04-18 PROCEDURE — 87086 URINE CULTURE/COLONY COUNT: CPT

## 2023-04-18 PROCEDURE — 80053 COMPREHEN METABOLIC PANEL: CPT

## 2023-04-18 PROCEDURE — 85027 COMPLETE CBC AUTOMATED: CPT

## 2023-04-18 PROCEDURE — 83735 ASSAY OF MAGNESIUM: CPT

## 2023-04-18 PROCEDURE — 87640 STAPH A DNA AMP PROBE: CPT

## 2023-04-18 PROCEDURE — 82962 GLUCOSE BLOOD TEST: CPT

## 2023-04-18 PROCEDURE — 87635 SARS-COV-2 COVID-19 AMP PRB: CPT

## 2023-04-18 PROCEDURE — 93005 ELECTROCARDIOGRAM TRACING: CPT

## 2023-04-18 PROCEDURE — 84100 ASSAY OF PHOSPHORUS: CPT

## 2023-04-18 PROCEDURE — 87040 BLOOD CULTURE FOR BACTERIA: CPT

## 2023-04-18 PROCEDURE — 80048 BASIC METABOLIC PNL TOTAL CA: CPT

## 2023-04-18 PROCEDURE — 96374 THER/PROPH/DIAG INJ IV PUSH: CPT

## 2023-04-18 PROCEDURE — 85730 THROMBOPLASTIN TIME PARTIAL: CPT

## 2023-04-18 PROCEDURE — 74176 CT ABD & PELVIS W/O CONTRAST: CPT

## 2023-04-18 NOTE — DISCHARGE NOTE NURSING/CASE MANAGEMENT/SOCIAL WORK - PATIENT PORTAL LINK FT
You can access the FollowMyHealth Patient Portal offered by [facility] by registering at the following website: [email or web address]. By joining [email or web address]’s FollowMyHealth portal, you will also be able to view your health information using other applications (apps) compatible with our system.

## 2023-04-18 NOTE — DISCHARGE NOTE PROVIDER - CARE PROVIDERS DIRECT ADDRESSES
,DirectAddress_Unknown ,DirectAddress_Unknown,DirectAddress_Unknown [place] Pregnancy And Lactation Text: This medication is not safe during pregnancy. It is unknown if this medication is excreted in breast milk.

## 2023-04-18 NOTE — DISCHARGE NOTE PROVIDER - NSDCMRMEDTOKEN_GEN_ALL_CORE_FT
amLODIPine 10 mg oral tablet: 1 tab(s) orally once a day  atorvastatin 40 mg oral tablet: 1 orally once a day (at bedtime)  Eliquis 5 mg oral tablet: 1 orally 2 times a day  ezetimibe 10 mg oral tablet: 1 orally once a day  glimepiride 2 mg oral tablet: 1 orally once a day  losartan 100 mg oral tablet: 1 tab(s) orally once a day  metFORMIN 500 mg oral tablet: 1 orally 2 times a day

## 2023-04-18 NOTE — DISCHARGE NOTE NURSING/CASE MANAGEMENT/SOCIAL WORK - NSDCPEFALRISK_GEN_ALL_CORE
For information on Fall & Injury Prevention, visit: [email or web address] OR  [email or web address] OR  https://www.cdc.gov/steadi/patient.html

## 2023-04-18 NOTE — DISCHARGE NOTE PROVIDER - PROVIDER TOKENS
PROVIDER:[TOKEN:[[id number]],FOLLOWUP:[1 week]] PROVIDER:[TOKEN:[[id number]],FOLLOWUP:[1 week]],PROVIDER:[TOKEN:[[id number]],FOLLOWUP:[2 weeks]]

## 2023-04-18 NOTE — DISCHARGE NOTE PROVIDER - HOSPITAL COURSE
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 Patient states that she has been having dysuria since last November, when she returned to her home country [facility]. Patient states that the dysuria was intermittent, but started to become worse on April 6. This last Thursday, patient went to see Dr. Faiza Higgins and at the time was told she had a urinary tract infection. Patient was given Nitrofurantoin for ten days. Patient was instructed to come to the hospital for admission for multidrug resistant urinary tract infection. Patient admitted to medicine for UTI and was started on Meropenem. U. cx negative, Meropenem was changed to Invanz last dose 04/18.      pt completed abx course. pt is now medically optimized for discharge. 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 Patient states that she has been having dysuria since last November, when she returned to her home country [facility]. Patient states that the dysuria was intermittent, but started to become worse on April 6. This last Thursday, patient went to see Dr. Faiza Higgins and at the time was told she had a urinary tract infection. Patient was given Nitrofurantoin for ten days. Patient was instructed to come to the hospital for admission for multidrug resistant urinary tract infection. Patient admitted to medicine for UTI and was started on Meropenem. U. cx negative, Meropenem was changed to Invanz last dose 04/18.      pt completed abx course. pt is now medically optimized for discharge.     Follow up with your primary provider within one week from discharge. Follow up with urologist in 1-2 weeks after discharge. Call for appointment.    Given patient's improved clinical status and current hemodynamic stability, decision was made to discharge.  Please refer to patient's complete medical chart with documents for a full hospital course, for this is only a brief summary.

## 2023-04-18 NOTE — DISCHARGE NOTE PROVIDER - NSDCCPCAREPLAN_GEN_ALL_CORE_FT
PRINCIPAL DISCHARGE DIAGNOSIS  Diagnosis: Acute UTI  Assessment and Plan of Treatment: you came into hospital for a UTI and was treated with IV antibiotics.   follow up with your OBGYN Dr. Higgins in 1 week.  A UTI is caused by bacteria that get inside your urinary tract. Your urinary tract includes your kidneys, ureters, bladder, and urethra. Urine is made in your kidneys, and it flows from the ureters to the bladder. Urine leaves the bladder through the urethra. A UTI is more common in your lower urinary tract, which includes your bladder and urethra.  What can I do to prevent a UTI?  Empty your bladder often.   Wipe from front to back after you urinate or have a bowel movement.   Change sanitary pads or tampons often.      SECONDARY DISCHARGE DIAGNOSES  Diagnosis: Right ovarian cyst  Assessment and Plan of Treatment: you had a CT scan of your abdomen and you were found 1.3 cm right ovarian cyst.   ovarian cysts often do not need treatment. They often go away on their own within 8 to 12 weeks.  continue to monitor it with your OBGYN.    Diagnosis: Hypokalemia  Assessment and Plan of Treatment: during your hospitalization you were treated for low potassium and now back to normal.    Diagnosis: History of DVT of lower extremity  Assessment and Plan of Treatment: continue taking eliquis 5 mg twice a day    Diagnosis: HTN (hypertension)  Assessment and Plan of Treatment: continue taking losartan 100 mg dailly and amlodipine 10 mg daily  your blood pressure ranged between: (115/74 - 140/63)  follow up with your primary care doctor or cardiologist.  try to take your blood pressure readings twice a day, morning and night time.   Notify your doctor if you have any of the following symptoms:   Dizziness, Lightheadedness, Blurry vision, Headache, Chest pain, Shortness of breath      Diagnosis: DM (diabetes mellitus)  Assessment and Plan of Treatment: continue taking metformin 500 mg twice a day and glimepiride 2 mg daily    Diagnosis: HLD (hyperlipidemia)  Assessment and Plan of Treatment: continue taking atorvastatin 40 mg daily and ezetimibe 10 mg daily     PRINCIPAL DISCHARGE DIAGNOSIS  Diagnosis: Acute UTI  Assessment and Plan of Treatment: you came into hospital for a UTI and was treated with IV antibiotics.   follow up with your OBGYN Dr. Higgins in 1 week.  follow up with urology dr. Rajan in 1-2 weeks.  A UTI is caused by bacteria that get inside your urinary tract. Your urinary tract includes your kidneys, ureters, bladder, and urethra. Urine is made in your kidneys, and it flows from the ureters to the bladder. Urine leaves the bladder through the urethra. A UTI is more common in your lower urinary tract, which includes your bladder and urethra.  What can I do to prevent a UTI?  Empty your bladder often.   Wipe from front to back after you urinate or have a bowel movement.   Change sanitary pads or tampons often.      SECONDARY DISCHARGE DIAGNOSES  Diagnosis: History of DVT of lower extremity  Assessment and Plan of Treatment: continue taking eliquis 5 mg twice a day    Diagnosis: DM (diabetes mellitus)  Assessment and Plan of Treatment: continue taking metformin 500 mg twice a day and glimepiride 2 mg daily    Diagnosis: HTN (hypertension)  Assessment and Plan of Treatment: continue taking losartan 100 mg dailly and amlodipine 10 mg daily  your blood pressure ranged between: (115/74 - 140/63)  follow up with your primary care doctor or cardiologist.  try to take your blood pressure readings twice a day, morning and night time.   Notify your doctor if you have any of the following symptoms:   Dizziness, Lightheadedness, Blurry vision, Headache, Chest pain, Shortness of breath      Diagnosis: HLD (hyperlipidemia)  Assessment and Plan of Treatment: continue taking atorvastatin 40 mg daily and ezetimibe 10 mg daily    Diagnosis: Hypokalemia  Assessment and Plan of Treatment: during your hospitalization you were treated for low potassium and now back to normal.    Diagnosis: Right ovarian cyst  Assessment and Plan of Treatment: you had a CT scan of your abdomen and you were found 1.3 cm right ovarian cyst.   ovarian cysts often do not need treatment. They often go away on their own within 8 to 12 weeks.  continue to monitor it with your OBGYN.     PRINCIPAL DISCHARGE DIAGNOSIS  Diagnosis: Urinary tract infection due to extended-spectrum beta lactamase (ESBL) producing Escherichia coli  Assessment and Plan of Treatment: you came into hospital for a UTI and was treated with IV antibiotics.   follow up with your OBGYN Dr. Higgins in 1 week.  follow up with urology dr. Rajan in 1-2 weeks.  A UTI is caused by bacteria that get inside your urinary tract. Your urinary tract includes your kidneys, ureters, bladder, and urethra. Urine is made in your kidneys, and it flows from the ureters to the bladder. Urine leaves the bladder through the urethra. A UTI is more common in your lower urinary tract, which includes your bladder and urethra.  What can I do to prevent a UTI?  Empty your bladder often.   Wipe from front to back after you urinate or have a bowel movement.   Change sanitary pads or tampons often.      SECONDARY DISCHARGE DIAGNOSES  Diagnosis: History of DVT of lower extremity  Assessment and Plan of Treatment: continue taking eliquis 5 mg twice a day    Diagnosis: DM (diabetes mellitus)  Assessment and Plan of Treatment: continue taking metformin 500 mg twice a day and glimepiride 2 mg daily    Diagnosis: HTN (hypertension)  Assessment and Plan of Treatment: continue taking losartan 100 mg dailly and amlodipine 10 mg daily  your blood pressure ranged between: (115/74 - 140/63)  follow up with your primary care doctor or cardiologist.  try to take your blood pressure readings twice a day, morning and night time.   Notify your doctor if you have any of the following symptoms:   Dizziness, Lightheadedness, Blurry vision, Headache, Chest pain, Shortness of breath      Diagnosis: HLD (hyperlipidemia)  Assessment and Plan of Treatment: continue taking atorvastatin 40 mg daily and ezetimibe 10 mg daily    Diagnosis: Hypokalemia  Assessment and Plan of Treatment: during your hospitalization you were treated for low potassium and now back to normal.    Diagnosis: Right ovarian cyst  Assessment and Plan of Treatment: you had a CT scan of your abdomen and you were found 1.3 cm right ovarian cyst.   ovarian cysts often do not need treatment. They often go away on their own within 8 to 12 weeks.  continue to monitor it with your OBGYN.    Diagnosis: Hypercoagulable state  Assessment and Plan of Treatment: Continue on eliquis.    Diagnosis: Hypokalemia  Assessment and Plan of Treatment: Potassium repleted

## 2023-04-18 NOTE — DISCHARGE NOTE PROVIDER - CARE PROVIDER_API CALL
Faiza Higgins; MPH)  Obstetrics and Gynecology  [address] [place] [address]  Phone: [phone]  Fax: [phone]  Follow Up Time: 1 week   Faiza Higgins; MPH)  Obstetrics and Gynecology  [address] [place] [address]  Phone: [phone]  Fax: [phone]  Follow Up Time: 1 week    Alonso Gustafson)  Urology  [address] [place] [address]  Phone: [phone]  Fax: [phone]  Follow Up Time: 2 weeks

## 2023-04-18 NOTE — DISCHARGE NOTE PROVIDER - ATTENDING DISCHARGE PHYSICAL EXAMINATION:
GENERAL: NAD, well-developed  HEAD:  Atraumatic, Normocephalic  EYES: EOMI, PERRLA, conjunctiva and sclera clear  NECK: Supple, No JVD  CHEST/LUNG: Clear to auscultation bilaterally; No wheeze  HEART: Regular rate and rhythm; No murmurs, rubs, or gallops  ABDOMEN: Soft, Nontender, Nondistended; Bowel sounds present  EXTREMITIES:  2+ Peripheral Pulses, No clubbing, cyanosis, or edema  PSYCH: AAOx3  NEUROLOGY: non-focal  SKIN: No rashes or lesions    Patient presented with UTI, outpatient cultures showing ESBL. Treated with ertapenem. Outpatient follow-up with GYN and Urology.

## 2023-04-19 LAB
CULTURE RESULTS: SIGNIFICANT CHANGE UP
CULTURE RESULTS: SIGNIFICANT CHANGE UP
SPECIMEN SOURCE: SIGNIFICANT CHANGE UP
SPECIMEN SOURCE: SIGNIFICANT CHANGE UP

## 2023-04-21 ENCOUNTER — APPOINTMENT (OUTPATIENT)
Dept: UROLOGY | Facility: CLINIC | Age: 78
End: 2023-04-21
Payer: MEDICARE

## 2023-04-21 VITALS
HEIGHT: 63 IN | TEMPERATURE: 97.9 F | BODY MASS INDEX: 30.65 KG/M2 | WEIGHT: 173 LBS | SYSTOLIC BLOOD PRESSURE: 140 MMHG | DIASTOLIC BLOOD PRESSURE: 80 MMHG | OXYGEN SATURATION: 97 % | HEART RATE: 80 BPM

## 2023-04-21 PROBLEM — I82.409 ACUTE EMBOLISM AND THROMBOSIS OF UNSPECIFIED DEEP VEINS OF UNSPECIFIED LOWER EXTREMITY: Chronic | Status: ACTIVE | Noted: 2023-04-14

## 2023-04-21 PROBLEM — I26.99 OTHER PULMONARY EMBOLISM WITHOUT ACUTE COR PULMONALE: Chronic | Status: ACTIVE | Noted: 2023-04-14

## 2023-04-21 PROBLEM — E11.9 TYPE 2 DIABETES MELLITUS WITHOUT COMPLICATIONS: Chronic | Status: ACTIVE | Noted: 2023-04-14

## 2023-04-21 PROBLEM — E78.5 HYPERLIPIDEMIA, UNSPECIFIED: Chronic | Status: ACTIVE | Noted: 2023-04-14

## 2023-04-21 PROBLEM — I10 ESSENTIAL (PRIMARY) HYPERTENSION: Chronic | Status: ACTIVE | Noted: 2023-04-14

## 2023-04-21 PROCEDURE — 99204 OFFICE O/P NEW MOD 45 MIN: CPT

## 2023-04-24 NOTE — HISTORY OF PRESENT ILLNESS
[FreeTextEntry1] : cc recurrent uti /h/o stones?\par History of Present Illness:\par Patient is a 77 yo female with hx of HTN, DM, PE/DVT (in 2020 2/2 COVID\par vaccine, on Eliquis), HLD presents with dysuria. Patient states that she has\par been having dysuria since last November, when she returned to her home country\par [facility]. Patient states that the dysuria was intermittent, but started to become\par worse on April 6. This last Thursday, patient went to see Dr. Faiza Higgins\par and at the time was told she had a urinary tract infection. Patient was given\par Nitrofurantoin for ten days. Patient then received call from office stating\par that she has to come in due to urine culture results. Patient does not have\par urine culture results or has no knowledge of them. Patient denies fevers or\par chills. Denies abdominal pain, changes to bowel habits, nausea or vomiting.\par Denies chest pain, sob or lower extremity edema. Patient does report some left\par flank pain,\par ucx in ed neg \par ct no stones small adnexal cyst \par not sexually active

## 2023-04-24 NOTE — PHYSICAL EXAM
[General Appearance - Well Developed] : well developed [General Appearance - Well Nourished] : well nourished [Normal Appearance] : normal appearance [Well Groomed] : well groomed [General Appearance - In No Acute Distress] : no acute distress [Edema] : no peripheral edema [Respiration, Rhythm And Depth] : normal respiratory rhythm and effort [Exaggerated Use Of Accessory Muscles For Inspiration] : no accessory muscle use [Abdomen Soft] : soft [Abdomen Tenderness] : non-tender [Costovertebral Angle Tenderness] : no ~M costovertebral angle tenderness [Urethral Meatus] : the meatus of the urethra showed no abnormalities [FreeTextEntry1] : vaginal atrophy [Urinary Bladder Findings] : the bladder was normal on palpation [Normal Station and Gait] : the gait and station were normal for the patient's age [] : no rash [No Focal Deficits] : no focal deficits [Oriented To Time, Place, And Person] : oriented to person, place, and time [Affect] : the affect was normal [Mood] : the mood was normal [Not Anxious] : not anxious [No Palpable Adenopathy] : no palpable adenopathy

## 2023-04-24 NOTE — ASSESSMENT
[FreeTextEntry1] : hosp records and imaging reviewed\par minimal post void residual \par advise start vag estrogen therapy \par f/u gyn

## 2023-05-04 ENCOUNTER — APPOINTMENT (OUTPATIENT)
Dept: UROLOGY | Facility: CLINIC | Age: 78
End: 2023-05-04
Payer: MEDICARE

## 2023-05-04 PROCEDURE — 99213 OFFICE O/P EST LOW 20 MIN: CPT

## 2023-05-04 NOTE — ASSESSMENT
[FreeTextEntry1] : ua reviewed \par soon after infection \par will repeat \par if microhem persists will need cysto \par \par f/u gyn for vaginal estrogen

## 2023-05-04 NOTE — HISTORY OF PRESENT ILLNESS
[FreeTextEntry1] : cc recurrent uti /h/o stones?\par History of Present Illness:\par Patient is a 79 yo female with hx of HTN, DM, PE/DVT (in 2020 2/2 COVID\par vaccine, on Eliquis), HLD presents with dysuria. Patient states that she has\par been having dysuria since last November, when she returned to her home country\par [facility]. Patient states that the dysuria was intermittent, but started to become\par worse on April 6. This last Thursday, patient went to see Dr. Faiza Higgins\par and at the time was told she had a urinary tract infection. Patient was given\par Nitrofurantoin for ten days. Patient then received call from office stating\par that she has to come in due to urine culture results. Patient does not have\par urine culture results or has no knowledge of them. Patient denies fevers or\par chills. Denies abdominal pain, changes to bowel habits, nausea or vomiting.\par Denies chest pain, sob or lower extremity edema. Patient does report some left\par flank pain,\par ucx in ed neg \par ct no stones small adnexal cyst \par not sexually active \par \par saw gyn pelvic sono ordered\par ua 3-10 rbc

## 2023-05-05 LAB
APPEARANCE: CLEAR
BACTERIA: NEGATIVE /HPF
BILIRUBIN URINE: NEGATIVE
BLOOD URINE: NEGATIVE
CAST: 1 /LPF
COLOR: NORMAL
EPITHELIAL CELLS: 1 /HPF
GLUCOSE QUALITATIVE U: NEGATIVE MG/DL
KETONES URINE: ABNORMAL MG/DL
LEUKOCYTE ESTERASE URINE: NEGATIVE
MICROSCOPIC-UA: NORMAL
NITRITE URINE: NEGATIVE
PH URINE: 7.5
PROTEIN URINE: 30 MG/DL
RED BLOOD CELLS URINE: 15 /HPF
REVIEW: NORMAL
SPECIFIC GRAVITY URINE: 1.02
UROBILINOGEN URINE: 1 MG/DL
WHITE BLOOD CELLS URINE: 1 /HPF

## 2023-05-17 ENCOUNTER — APPOINTMENT (OUTPATIENT)
Dept: UROLOGY | Facility: CLINIC | Age: 78
End: 2023-05-17
Payer: MEDICARE

## 2023-05-17 VITALS
SYSTOLIC BLOOD PRESSURE: 159 MMHG | OXYGEN SATURATION: 99 % | BODY MASS INDEX: 30.65 KG/M2 | HEART RATE: 85 BPM | HEIGHT: 63 IN | TEMPERATURE: 97.2 F | WEIGHT: 173 LBS | DIASTOLIC BLOOD PRESSURE: 76 MMHG

## 2023-05-17 DIAGNOSIS — N95.2 POSTMENOPAUSAL ATROPHIC VAGINITIS: ICD-10-CM

## 2023-05-17 DIAGNOSIS — N39.0 URINARY TRACT INFECTION, SITE NOT SPECIFIED: ICD-10-CM

## 2023-05-17 PROCEDURE — 52000 CYSTOURETHROSCOPY: CPT

## 2023-07-06 RX ORDER — CONJUGATED ESTROGENS 0.62 MG/G
0.62 CREAM VAGINAL
Qty: 1 | Refills: 0 | Status: ACTIVE | COMMUNITY
Start: 2023-05-31 | End: 1900-01-01

## 2024-01-31 ENCOUNTER — APPOINTMENT (OUTPATIENT)
Dept: GASTROENTEROLOGY | Facility: CLINIC | Age: 79
End: 2024-01-31
Payer: MEDICARE

## 2024-01-31 VITALS
OXYGEN SATURATION: 97 % | BODY MASS INDEX: 31.18 KG/M2 | HEART RATE: 85 BPM | WEIGHT: 176 LBS | TEMPERATURE: 98.2 F | SYSTOLIC BLOOD PRESSURE: 145 MMHG | DIASTOLIC BLOOD PRESSURE: 74 MMHG | HEIGHT: 63 IN

## 2024-01-31 DIAGNOSIS — K21.9 GASTRO-ESOPHAGEAL REFLUX DISEASE W/OUT ESOPHAGITIS: ICD-10-CM

## 2024-01-31 DIAGNOSIS — R93.89 ABNORMAL FINDINGS ON DIAGNOSTIC IMAGING OF OTHER SPECIFIED BODY STRUCTURES: ICD-10-CM

## 2024-01-31 PROCEDURE — 99204 OFFICE O/P NEW MOD 45 MIN: CPT

## 2024-01-31 RX ORDER — LINACLOTIDE 145 UG/1
145 CAPSULE, GELATIN COATED ORAL
Qty: 90 | Refills: 3 | Status: ACTIVE | COMMUNITY
Start: 2024-01-31 | End: 1900-01-01

## 2024-01-31 RX ORDER — SIMETHICONE 125 MG/1
125 TABLET, CHEWABLE ORAL
Qty: 120 | Refills: 3 | Status: ACTIVE | COMMUNITY
Start: 2024-01-31 | End: 1900-01-01

## 2024-01-31 RX ORDER — POLYETHYLENE GLYCOL 3350 AND ELECTROLYTES WITH LEMON FLAVOR 236; 22.74; 6.74; 5.86; 2.97 G/4L; G/4L; G/4L; G/4L; G/4L
236 POWDER, FOR SOLUTION ORAL
Qty: 1 | Refills: 0 | Status: ACTIVE | COMMUNITY
Start: 2024-01-31 | End: 1900-01-01

## 2024-02-08 ENCOUNTER — NON-APPOINTMENT (OUTPATIENT)
Age: 79
End: 2024-02-08

## 2024-03-07 ENCOUNTER — OUTPATIENT (OUTPATIENT)
Dept: OUTPATIENT SERVICES | Facility: HOSPITAL | Age: 79
LOS: 1 days | End: 2024-03-07
Payer: COMMERCIAL

## 2024-03-07 ENCOUNTER — RESULT REVIEW (OUTPATIENT)
Age: 79
End: 2024-03-07

## 2024-03-07 ENCOUNTER — APPOINTMENT (OUTPATIENT)
Dept: GASTROENTEROLOGY | Facility: HOSPITAL | Age: 79
End: 2024-03-07

## 2024-03-07 ENCOUNTER — TRANSCRIPTION ENCOUNTER (OUTPATIENT)
Age: 79
End: 2024-03-07

## 2024-03-07 VITALS
OXYGEN SATURATION: 99 % | HEART RATE: 70 BPM | DIASTOLIC BLOOD PRESSURE: 70 MMHG | RESPIRATION RATE: 16 BRPM | SYSTOLIC BLOOD PRESSURE: 114 MMHG

## 2024-03-07 VITALS
HEART RATE: 58 BPM | RESPIRATION RATE: 16 BRPM | OXYGEN SATURATION: 99 % | TEMPERATURE: 98 F | DIASTOLIC BLOOD PRESSURE: 55 MMHG | HEIGHT: 63 IN | WEIGHT: 173.06 LBS | SYSTOLIC BLOOD PRESSURE: 129 MMHG

## 2024-03-07 DIAGNOSIS — Z98.891 HISTORY OF UTERINE SCAR FROM PREVIOUS SURGERY: Chronic | ICD-10-CM

## 2024-03-07 DIAGNOSIS — K21.9 GASTRO-ESOPHAGEAL REFLUX DISEASE WITHOUT ESOPHAGITIS: ICD-10-CM

## 2024-03-07 DIAGNOSIS — R93.89 ABNORMAL FINDINGS ON DIAGNOSTIC IMAGING OF OTHER SPECIFIED BODY STRUCTURES: ICD-10-CM

## 2024-03-07 LAB — GLUCOSE BLDC GLUCOMTR-MCNC: 110 MG/DL — HIGH (ref 70–99)

## 2024-03-07 PROCEDURE — 88305 TISSUE EXAM BY PATHOLOGIST: CPT

## 2024-03-07 PROCEDURE — 88305 TISSUE EXAM BY PATHOLOGIST: CPT | Mod: 26

## 2024-03-07 PROCEDURE — 45378 DIAGNOSTIC COLONOSCOPY: CPT

## 2024-03-07 PROCEDURE — 82962 GLUCOSE BLOOD TEST: CPT

## 2024-03-07 PROCEDURE — 43239 EGD BIOPSY SINGLE/MULTIPLE: CPT

## 2024-03-07 PROCEDURE — 88312 SPECIAL STAINS GROUP 1: CPT | Mod: 26

## 2024-03-07 PROCEDURE — 88312 SPECIAL STAINS GROUP 1: CPT

## 2024-03-07 DEVICE — ESOPHAGEAL BALLOON CATH CRE FIXED WIRE 6-7-8MM: Type: IMPLANTABLE DEVICE | Status: FUNCTIONAL

## 2024-03-07 DEVICE — ESOPHAGEAL BALLOON CATH CRE FIXED WIRE 8-9-10MM: Type: IMPLANTABLE DEVICE | Status: FUNCTIONAL

## 2024-03-07 DEVICE — ESOPHAGEAL BALLOON CATH CRE FIXED WIRE 10-11-12MM: Type: IMPLANTABLE DEVICE | Status: FUNCTIONAL

## 2024-03-07 RX ORDER — SODIUM CHLORIDE 9 MG/ML
500 INJECTION INTRAMUSCULAR; INTRAVENOUS; SUBCUTANEOUS
Refills: 0 | Status: DISCONTINUED | OUTPATIENT
Start: 2024-03-07 | End: 2024-03-21

## 2024-03-07 NOTE — CHART NOTE - NSCHARTNOTEFT_GEN_A_CORE
History of Present Illness       The patient is an 79-year-old female with past medical history significant for hypertension, hypercholesterolemia, diabetes mellitus, thyroid nodule, pulmonary embolism and DVT requiring bridging with Lovenox while holding the Eliquis and followed by her hematologist, Dr. Foss  who was referred to my office by Dr. Matilde Ruano for an abnormal imaging study. The patient also admits to having dyspepsia, gastroesophageal reflux disease, constipation, change in bowel habits and change in caliber of stool. I was asked to render an opinion for consultation for the above complaints. The patient states that she is feeling weak. The patient was hospitalized at [facility] on 2023 for an abnormal urine lab result. The patient was followed by her gynecologist, Dr. Faiza Hernández. The patient was previously treated with nitrofurantoin for urinary tract infection. The patient had blood work and imaging studies performed in the emergency room to assess the symptoms. The blood work performed on 2023 revealed a normal hemoglobin/hematocrit level of 13.3/43.3, respectively, and elevated PTT/INR/PTT of 17.2/1.4/38.7, respectively, a low potassium level of 3.4 mmol/L, a low anion gap of 4 mmol/L, and elevated blood glucose of 113 mg/dL, and elevated total protein level of 8.9 g/dL, normal liver enzymes with a total bilirubin of 0.9 mg/dL, a normal alkaline phosphatase/AST/ALT of 68/19/17 U/L, respectively,. The urinalysis performed on 2023 revealed small ketones, small amount of blood in the urine, trace proteinuria of 15 mg/dL, trace leukocyte Estrace concentration, 2-5 RBCs per high-power field and few bacteria. The urine culture performed on 2023 revealed no growth. The blood cultures performed on 2023 revealed no growth x 2. The patient underwent a CAT scan renal stone hunt on April 15, 2023 that revealed no evidence for urinary calculus and no hydronephrosis. There was a 2.7 cm soft tissue density structure in the cecum that may represent a stool ball, polyp or cancer. Also noted with degenerative spondylosis, a small fat-containing left inguinal hernia, small fat-containing umbilical hernia, calcified atherosclerotic disease, a 1.3 cm cystic lesion in the right adnexa suggestive of a right ovarian cyst, atrophic pancreas, a 1.0 cm left adrenal nodule, small atelectasis. The patient's symptoms improved. The last blood work performed on 2023 revealed mild anemia with a hemoglobin of 11.3 g/dL, a normal hematocrit level of 36.9%, and elevated blood glucose of 105 mg/dL. The patient's symptoms improved and was discharged from the hospital on 2023 and advised to follow-up in the office for further workup and treatment. The patient denies any abdominal pain. The patient complains of abdominal gas and bloating. The patient denies any nausea or vomiting. The patient complains of gastroesophageal reflux disease but denies any dysphagia. The gastroesophageal reflux disease is worse after meals and late at night and in the early morning. The patient denies taking medications for the gastroesophageal reflux disease. The patient denies any atypical chest pain, shortness of breath or palpitations. The patient denies any diaphoresis. The patient complains of constipation but denies any diarrhea. The patient has 1 bowel movement every 3 to 4 days. The patient complains of a change in bowel habits. The patient complains of a change in caliber of stool. The patient denies having mucus discharge with the bowel movements. The patient denies any bright red blood per denies any weight loss or anorexia. The patient admits to gaining weight recently. She denies any fevers or chills. The patient denies any jaundice or pruritus. The patient denies any back pain. The patient admits to having a prior upper endoscopy and colonoscopy performed by another gastroenterologist at [facility]. According to the patient, the upper endoscopic findings at [facility] was unknown to the patient. The colonoscopic findings on 2019 was unknown to the patient. The patient's last menstrual period was age 54. The patient is a . The patient's first menstrual period was at age 14. The patient admits to a family history of GI problems. The patient's mother had a history of diverticulosis.  ?  (-) smoking, (-) ETOH, (-) IVDA  ?  Physical Exam:  General Appearance: Well developed, overweight, no acute distress  ENT: nose clear, ears unremarkable  Eyes: No enteric sclera, conjunctiva clear.  Neck: Supple, without masses  Respiratory: Breath sounds equal and bilateral, no wheezing no rales or rhonchi  Cardiovascular: S1-S2 audible, no murmur, no rubs or gallops  GI: (+) BS, soft, nontender, no rebound, no guarding, no masses  Liver: liver edge palpated  Rectal: not done  Musculo-skeletal: Good motor strength, poor range of motion, normal appearing extremities  Skin: Normal appearing skin, no jaundice, no rashes or nodules  Neurological: without focal motor or sensory deficits Patient is moving all extremities spontaneously and to command with normal muscle strength alert and oriented X3  Psychiatric: Good affect, not depressed, not anxious  Radiology Summary    CT: The CAT scan renal stone hunt on April 15, 2023 that revealed no evidence for urinary calculus and no hydronephrosis. There was a 2.7 cm soft tissue density structure in the cecum that may represent a stool ball, polyp or cancer. Also noted with degenerative spondylosis, a small fat-containing left inguinal hernia, small fat-containing umbilical hernia, calcified atherosclerotic disease, a 1.3 cm cystic lesion in the right adnexa suggestive of a right ovarian cyst, atrophic pancreas, a 1.0 cm left adrenal nodule, small atelectasis.  ?  Active Problems  Asthma, acute (493.90) (J45.909)  Atrophic vaginitis (627.3) (N95.2)  Diabetes (250.00) (E11.9)  DVT (deep venous thrombosis) (453.40) (I82.409)  HBP (high blood pressure) (401.9) (I10)  Recurrent UTI (599.0) (N39.0)       ?  Surgical History  History of  Section       ?  Family History  Family history of malignant neoplasm (V16.9) (Z80.9) : Sibling       ?  Social History  No alcohol use  Non-smoker (V49.89) (Z78.9)       ?  Current Meds  Alendronate Sodium 70 MG Oral Tablet  amLODIPine Besylate 10 MG Oral Tablet  Atorvastatin Calcium 40 MG Oral Tablet  Chlorthalidone 25 MG Oral Tablet  Eliquis 5 MG Oral Tablet  Ezetimibe 10 MG Oral Tablet  FreeStyle Lancets; CHECK BLOOD GLUCOSE TWICE DAILY  FreeStyle Lite Test In Vitro Strip; USE TWICE DAILY AS DIRECTED  FreeStyle Lite w/Device Kit; USE AS DIRECTED TWICE DAILY  Glimepiride 2 MG Oral Tablet  Losartan Potassium 100 MG Oral Tablet  metFORMIN HCl - 500 MG Oral Tablet  Premarin 0.625 MG/GM Vaginal Cream; APPLY A PEA-SIZED AMOUNT TO VAGINAL  OPENING 3 TO 4 TIMES PER WEEK       Allergies  No Known Allergies       ?  Vitals  Vital Signs  ?  Recorded: 2024 07:15AM  Systolic  145, LUE, Sitting  Diastolic  74, LUE, Sitting  Height  5 ft 3 in  Weight  176 lb  BMI Calculated  31.18 kg/m2  BSA Calculated  1.83 m2  Temperature  98.2 F, Temporal  Heart Rate  85  O2 Saturation  97 %, Room Air  FiO2 Flow Rate  0 L/min, Room Air       ?  Assessment  Abnormal finding on imaging (793.99) (R93.89)  Constipation, unspecified constipation type (564.00) (K59.00)  Dyspepsia (536.8) (R10.13)  GERD without esophagitis (530.81) (K21.9)  Dyspepsia: The patient complains of dyspeptic symptoms. The patient was advised to continue to abide by an anti-gas (low FOD-MAP) diet. The patient was previously given a pamphlet for anti-gas (low FOD-MAP). The patient and I reviewed the anti-gas (low FOD-MAP)diet at length again. The patient is to continue on a trial of Simethicone one tablet 4 times a day p.r.n. abdominal pain and gas.  GERD: The patient was advised to avoid late-night meals and dietary indiscretions. The patient was advised to avoid fried and fatty foods. The patient was advised to abide by an anti-GERD diet. The patient was given a pamphlet for anti-GERD. The patient and I reviewed the anti-GERD diet at length. I recommend a trial of famotidine 40 mg twice a day x 3 months for the symptoms.  Constipation: The patient complains of constipation. I recommend a high-fiber diet. I recommend a trial of a probiotic such as Align once a day. I recommend a trial of Metamucil once a day for fiber supplementation. I recommend a trial of Linzess 145 mcg once a day for the constipation. The patient agreed and will followup to reassess the symptoms.  Abnormal Imaging Study: The CAT scan renal stone hunt on April 15, 2023 that revealed no evidence for urinary calculus and no hydronephrosis. There was a 2.7 cm soft tissue density structure in the cecum that may represent a stool ball, polyp or cancer. Also noted with degenerative spondylosis, a small fat-containing left inguinal hernia, small fat-containing umbilical hernia, calcified atherosclerotic disease, a 1.3 cm cystic lesion in the right adnexa suggestive of a right ovarian cyst, atrophic pancreas, a 1.0 cm left adrenal nodule, small atelectasis.  Prior ER Evaluation: The patient was previously evaluated at the [facility] [place] emergency room for urinary tract infection. The patient had blood work and imaging studies to assess the symptoms. I reviewed the blood work and imaging studies performed in the emergency room.  Colonoscopy: I recommend a colonoscopy to assess the symptoms and for colonic polyps. The patient was told of the risks and benefits of the procedure. The patient was told of the risks of perforation, emergency surgery, bleeding, infections and missed lesions. The patient is to be on a clear liquid diet the entire day prior to the procedure. The patient is to complete the entire prescribed bowel prep the day prior to the procedure as directed. The patient is told not to drive, drink alcohol, use recreational drugs, exercise, or work the day of the procedure. The patient was told of the need for an escort to accompany the patient home after the procedure. The patient is aware that the procedure may be cancelled if they fail to follow the directions. The patient agreed and will schedule for the procedure. The patient can take the antihypertensive medication with a sip of water one hour prior to the procedure. The patient is to hold the diabetic medication the day before and the morning of the procedure. The patient is to hold the blood thinner medication (Eliquis) for 2 days prior to the procedure. The patient is to be n.p.o. after midnight and bowel prep was given. The patient is to return for the procedure.  Upper Endoscopy: I recommend an upper endoscopy to assess for peptic ulcer disease versus esophagitis. The patient was told of the risks and benefits of the procedure. The patient was told of the risks of perforation, emergency surgery, bleeding, infections and missed lesions. The patient is told not to drive, drink alcohol, use recreational drugs, exercise, or work the day of the procedure. The patient was told of the need for an escort to accompany the patient home after the procedure. The patient is aware that the procedure may be cancelled if they fail to follow the directions. The patient agreed and will schedule for the procedure. The patient can take the antihypertensive medication with a sip of water one hour prior to the procedure. The patient is to hold the diabetic medication the day the morning of the procedure. The patient is to be n.p.o. after midnight. The patient is to return for the procedure.  I recommend cardiac clearance prior to the procedures. The patient agrees and will follow-up with her cardiologist.  Follow-up: The patient is to follow-up in the office in 4 weeks to reassess the symptoms. The patient was told to call the office if any further problems.          ?  Plan  Abnormal finding on imaging  Start: PEG-3350/Electrolytes 236 GM Oral Solution Reconstituted; MIX AS DIRECTED  AND DRINK OVER 4 HOURS, START AT 4PM  Colonoscopy Diagnostic; Status:Hold For - Scheduling; Requested for:[id number];  Constipation, unspecified constipation type  Start: Linzess 145 MCG Oral Capsule; TAKE 1 CAPSULE Daily  Dyspepsia  Start: Simethicone 125 MG Oral Tablet Chewable; CHEW AND SWALLOW 1 TABLET 4  TIMES DAILY, AFTER MEALS AND AT BEDTIME  GERD without esophagitis  Start: Famotidine 40 MG Oral Tablet; Take 1 tablet twice daily  Upper GI Endoscopy; Status:Active; Requested for:[id number];       ?

## 2024-03-11 LAB — SURGICAL PATHOLOGY STUDY: SIGNIFICANT CHANGE UP

## 2024-04-03 ENCOUNTER — RX RENEWAL (OUTPATIENT)
Age: 79
End: 2024-04-03

## 2024-04-03 ENCOUNTER — APPOINTMENT (OUTPATIENT)
Dept: GASTROENTEROLOGY | Facility: CLINIC | Age: 79
End: 2024-04-03
Payer: MEDICARE

## 2024-04-03 VITALS
TEMPERATURE: 97.2 F | OXYGEN SATURATION: 98 % | DIASTOLIC BLOOD PRESSURE: 67 MMHG | WEIGHT: 171 LBS | BODY MASS INDEX: 30.3 KG/M2 | HEART RATE: 63 BPM | HEIGHT: 63 IN | SYSTOLIC BLOOD PRESSURE: 131 MMHG

## 2024-04-03 DIAGNOSIS — K64.8 OTHER HEMORRHOIDS: ICD-10-CM

## 2024-04-03 DIAGNOSIS — K59.00 CONSTIPATION, UNSPECIFIED: ICD-10-CM

## 2024-04-03 DIAGNOSIS — R10.13 EPIGASTRIC PAIN: ICD-10-CM

## 2024-04-03 DIAGNOSIS — K29.30 CHRONIC SUPERFICIAL GASTRITIS W/OUT BLEEDING: ICD-10-CM

## 2024-04-03 DIAGNOSIS — K44.9 DIAPHRAGMATIC HERNIA W/OUT OBSTRUCTION OR GANGRENE: ICD-10-CM

## 2024-04-03 DIAGNOSIS — K57.30 DIVERTICULOSIS OF LARGE INTESTINE W/OUT PERFORATION OR ABSCESS W/OUT BLEEDING: ICD-10-CM

## 2024-04-03 DIAGNOSIS — K21.00 GASTRO-ESOPHAGEAL REFLUX DISEASE WITH ESOPHAGITIS, WITHOUT BLEEDING: ICD-10-CM

## 2024-04-03 PROCEDURE — 99214 OFFICE O/P EST MOD 30 MIN: CPT

## 2024-04-03 RX ORDER — PANTOPRAZOLE 40 MG/1
40 TABLET, DELAYED RELEASE ORAL DAILY
Qty: 90 | Refills: 0 | Status: ACTIVE | COMMUNITY
Start: 2024-04-03 | End: 1900-01-01

## 2024-04-03 RX ORDER — HYDROCORTISONE 25 MG/G
2.5 CREAM TOPICAL 3 TIMES DAILY
Qty: 2 | Refills: 3 | Status: ACTIVE | COMMUNITY
Start: 2024-04-03 | End: 1900-01-01

## 2024-04-03 RX ORDER — SIMETHICONE 125 MG/1
125 TABLET, CHEWABLE ORAL
Qty: 120 | Refills: 3 | Status: ACTIVE | COMMUNITY
Start: 2024-04-03 | End: 1900-01-01

## 2024-04-03 NOTE — HISTORY OF PRESENT ILLNESS
[FreeTextEntry1] : The colonoscopy to the terminal ileum performed at the [facility] [place] GI endoscopy suite on March 7, 2024 revealed severe pandiverticulosis that was primarily concentrated to the left colon, a rigid, thickened, hypertrophied sigmoid colon secondary to diverticular disease and small internal hemorrhoids. There was no cecal lesion noted.  There were no polyps, masses, AVMs or colitis noted.    [de-identified] : The CAT scan renal stone hunt on April 15, 2023 that revealed no evidence for urinary calculus and no hydronephrosis.  There was a 2.7 cm soft tissue density structure in the cecum that may represent a stool ball, polyp or cancer.  Also noted with degenerative spondylosis, a small fat-containing left inguinal hernia, small fat-containing umbilical hernia, calcified atherosclerotic disease, a 1.3 cm cystic lesion in the right adnexa suggestive of a right ovarian cyst, atrophic pancreas, a 1.0 cm left adrenal nodule, small atelectasis.

## 2024-04-03 NOTE — ASSESSMENT
[FreeTextEntry1] : Dyspepsia: The patient complains of dyspeptic symptoms.  The patient was advised to continue to abide by an anti-gas (low FOD-MAP) diet.  The patient was previously given a pamphlet for anti-gas (low FOD-MAP).  The patient and I reviewed the anti-gas (low FOD-MAP) diet at length again. The patient is to continue on a trial of Simethicone one tablet 4 times a day p.r.n. abdominal pain and gas. Constipation: The patient complains of constipation. I recommend a high-fiber diet. I recommend a trial of a probiotic such as Align once a day. I recommend a trial of Metamucil once a day for fiber supplementation. I recommend a trial of Linzess 145 mcg once a day for the constipation.  The patient agreed and will followup to reassess the symptoms.   Hiatal Hernia:  The patient has a history of a hiatal hernia noted on prior upper endoscopy. The patient was advised to avoid late-night meals and dietary indiscretions.  The patient was advised to avoid fried and fatty foods.  The patient was advised to abide by an anti-GERD diet. The patient was given a pamphlet for anti-GERD.  The patient and I reviewed the anti-GERD diet at length. I recommend a trial of Pantoprazole 40 mg once a day for 3 months for the symptoms. Reflux Esophagitis:  The patient had reflux esophagitis noted on prior upper endoscopy.  The patient was advised to avoid late-night meals and dietary indiscretions.  The patient was advised to avoid fried and fatty foods.  The patient was advised to abide by an anti-GERD diet. The patient was given a pamphlet for anti-GERD.  The patient and I reviewed the anti-GERD diet at length.  I recommend a trial of pantoprazole 40 mg once a day x 3 months for the reflux esophagitis. Gastritis: The patient has a history of gastritis noted on prior upper endoscopy. The patient is to avoid nonsteroidal anti-inflammatory drugs and aspirin. I recommend a trial of pantoprazole 40 mg once a day for 3 months for the symptoms.  Diverticulosis: I recommend a high-fiber diet and avoid seeds. The patient is to consider a trial of Metamucil once a day for fiber supplementation. The patient is to also consider a trial of a probiotic such as Align once a day. The patient and I discussed the potential risk of diverticulitis and diverticular bleeding secondary to diverticular disease. The patient is aware of the importance of follow-up if these complications arise. Internal Hemorrhoids: The patient is to consider starting a trial of Anusol H. C. suppositories one per rectum nightly and Anusol HC2 .5% cream apply to affected area twice a day p.r.n. hemorrhoidal bleeding or pain. I also recommend a trial of Calmoseptine cream apply to affected area twice a day for hemorrhoidal discomfort.  I recommend Tucks pads for the hemorrhoids.  I recommend Sitz baths twice a day for the hemorrhoids.  I recommend avoid wearing tight underwear and use boxers.  I recommend avoid touching the perineal area.   The patient agreed to followup.  Abnormal Imaging Study: The CAT scan renal stone hunt on April 15, 2023 that revealed no evidence for urinary calculus and no hydronephrosis. There was a 2.7 cm soft tissue density structure in the cecum that may represent a stool ball, polyp or cancer. Also noted with degenerative spondylosis, a small fat-containing left inguinal hernia, small fat-containing umbilical hernia, calcified atherosclerotic disease, a 1.3 cm cystic lesion in the right adnexa suggestive of a right ovarian cyst, atrophic pancreas, a 1.0 cm left adrenal nodule, small atelectasis.  There was no cecal lesion noted. Prior ER Evaluation: The patient was previously evaluated at the [facility] emergency room for urinary tract infection. The patient had blood work and imaging studies to assess the symptoms. I reviewed the blood work and imaging studies performed in the emergency room. Follow-up: The patient is to follow-up in the office in 4 weeks to reassess the symptoms. The patient was told to call the office if any further problems.

## 2024-05-07 ENCOUNTER — RX RENEWAL (OUTPATIENT)
Age: 79
End: 2024-05-07

## 2024-05-07 RX ORDER — LINACLOTIDE 145 UG/1
145 CAPSULE, GELATIN COATED ORAL
Qty: 30 | Refills: 0 | Status: ACTIVE | COMMUNITY
Start: 2024-04-03 | End: 1900-01-01

## 2024-05-29 ENCOUNTER — RX RENEWAL (OUTPATIENT)
Age: 79
End: 2024-05-29

## 2024-05-29 RX ORDER — FAMOTIDINE 40 MG/1
40 TABLET, FILM COATED ORAL
Qty: 180 | Refills: 0 | Status: ACTIVE | COMMUNITY
Start: 2024-01-31 | End: 1900-01-01

## 2024-10-03 ENCOUNTER — NON-APPOINTMENT (OUTPATIENT)
Age: 79
End: 2024-10-03

## 2024-10-04 ENCOUNTER — APPOINTMENT (OUTPATIENT)
Dept: GASTROENTEROLOGY | Facility: CLINIC | Age: 79
End: 2024-10-04
Payer: MEDICARE

## 2024-10-04 VITALS
TEMPERATURE: 97.2 F | HEIGHT: 63 IN | WEIGHT: 171 LBS | SYSTOLIC BLOOD PRESSURE: 146 MMHG | DIASTOLIC BLOOD PRESSURE: 67 MMHG | OXYGEN SATURATION: 98 % | BODY MASS INDEX: 30.3 KG/M2 | HEART RATE: 66 BPM

## 2024-10-04 DIAGNOSIS — K59.00 CONSTIPATION, UNSPECIFIED: ICD-10-CM

## 2024-10-04 DIAGNOSIS — K64.8 OTHER HEMORRHOIDS: ICD-10-CM

## 2024-10-04 DIAGNOSIS — R93.89 ABNORMAL FINDINGS ON DIAGNOSTIC IMAGING OF OTHER SPECIFIED BODY STRUCTURES: ICD-10-CM

## 2024-10-04 DIAGNOSIS — K21.00 GASTRO-ESOPHAGEAL REFLUX DISEASE WITH ESOPHAGITIS, WITHOUT BLEEDING: ICD-10-CM

## 2024-10-04 DIAGNOSIS — R10.13 EPIGASTRIC PAIN: ICD-10-CM

## 2024-10-04 DIAGNOSIS — K21.9 GASTRO-ESOPHAGEAL REFLUX DISEASE W/OUT ESOPHAGITIS: ICD-10-CM

## 2024-10-04 DIAGNOSIS — K44.9 DIAPHRAGMATIC HERNIA W/OUT OBSTRUCTION OR GANGRENE: ICD-10-CM

## 2024-10-04 DIAGNOSIS — K57.30 DIVERTICULOSIS OF LARGE INTESTINE W/OUT PERFORATION OR ABSCESS W/OUT BLEEDING: ICD-10-CM

## 2024-10-04 PROCEDURE — 99214 OFFICE O/P EST MOD 30 MIN: CPT

## 2024-10-04 NOTE — ASSESSMENT
[FreeTextEntry1] : Dyspepsia: The patient complains of dyspeptic symptoms.  The patient was advised to continue to abide by an anti-gas (low FOD-MAP) diet.  The patient was previously given a pamphlet for anti-gas (low FOD-MAP).  The patient and I reviewed the anti-gas (low FOD-MAP) diet at length again. The patient is to continue on a trial of Simethicone one tablet 4 times a day p.r.n. abdominal pain and gas. GERD: The patient was advised to avoid late-night meals and dietary indiscretions.  The patient was advised to avoid fried and fatty foods.  The patient was advised to abide by an anti-GERD diet. The patient was given a pamphlet for anti-GERD.  The patient and I reviewed the anti-GERD diet at length. I recommend a trial of famotidine 40 mg twice a day x 3 months for the symptoms. Constipation: The patient complains of constipation. I recommend a high-fiber diet. I recommend a trial of a probiotic such as Align once a day. I recommend a trial of Metamucil once a day for fiber supplementation. I recommend a trial of Linzess 145 mcg once a day for the constipation.   The patient agreed and will followup to reassess the symptoms.   Hiatal Hernia: The patient has a history of a hiatal hernia noted on prior upper endoscopy. The patient was advised to avoid late-night meals and dietary indiscretions. The patient was advised to avoid fried and fatty foods. The patient was advised to abide by an anti-GERD diet. The patient was given a pamphlet for anti-GERD. The patient and I reviewed the anti-GERD diet at length. I recommend a trial of Pantoprazole 40 mg once a day for 3 months for the symptoms. Reflux Esophagitis: The patient had reflux esophagitis noted on prior upper endoscopy. The patient was advised to avoid late-night meals and dietary indiscretions. The patient was advised to avoid fried and fatty foods. The patient was advised to abide by an anti-GERD diet. The patient was given a pamphlet for anti-GERD. The patient and I reviewed the anti-GERD diet at length. I recommend a trial of pantoprazole 40 mg once a day x 3 months for the reflux esophagitis. Gastritis: The patient has a history of gastritis noted on prior upper endoscopy. The patient is to avoid nonsteroidal anti-inflammatory drugs and aspirin. I recommend a trial of pantoprazole 40 mg once a day for 3 months for the symptoms. Diverticulosis: I recommend a high-fiber diet and avoid seeds. The patient is to consider a trial of Metamucil once a day for fiber supplementation. The patient is to also consider a trial of a probiotic such as Align once a day. The patient and I discussed the potential risk of diverticulitis and diverticular bleeding secondary to diverticular disease. The patient is aware of the importance of follow-up if these complications arise. Internal Hemorrhoids: The patient is to consider starting a trial of Anusol H. C. suppositories one per rectum nightly and Anusol HC2.5% cream apply to affected area twice a day p.r.n. hemorrhoidal bleeding or pain. I also recommend a trial of Calmoseptine cream apply to affected area twice a day for hemorrhoidal discomfort. I recommend Tucks pads for the hemorrhoids. I recommend Sitz baths twice a day for the hemorrhoids. I recommend avoid wearing tight underwear and use boxers. I recommend avoid touching the perineal area. The patient agreed to followup. Abnormal Imaging Study: The CAT scan renal stone hunt on April 15, 2023 that revealed no evidence for urinary calculus and no hydronephrosis. There was a 2.7 cm soft tissue density structure in the cecum that may represent a stool ball, polyp or cancer. Also noted with degenerative spondylosis, a small fat-containing left inguinal hernia, small fat-containing umbilical hernia, calcified atherosclerotic disease, a 1.3 cm cystic lesion in the right adnexa suggestive of a right ovarian cyst, atrophic pancreas, a 1.0 cm left adrenal nodule, small atelectasis. There was no cecal lesion noted. Prior ER Evaluation: The patient was previously evaluated at the [facility] [place] emergency room for urinary tract infection. The patient had blood work and imaging studies to assess the symptoms. I reviewed the blood work and imaging studies performed in the emergency room. Follow-up: The patient is to follow-up in the office in 6 months to reassess the symptoms. The patient was told to call the office if any further problems.

## 2024-10-04 NOTE — HISTORY OF PRESENT ILLNESS
[FreeTextEntry1] : The colonoscopy to the terminal ileum performed at the [facility] [place] GI endoscopy suite on March 7, 2024 revealed severe pandiverticulosis that was primarily concentrated to the left colon, a rigid, thickened, hypertrophied sigmoid colon secondary to diverticular disease and small internal hemorrhoids. There was no cecal lesion noted.  There were no polyps, masses, AVMs or colitis noted.    [de-identified] : The CAT scan renal stone hunt on April 15, 2023 that revealed no evidence for urinary calculus and no hydronephrosis.  There was a 2.7 cm soft tissue density structure in the cecum that may represent a stool ball, polyp or cancer.  Also noted with degenerative spondylosis, a small fat-containing left inguinal hernia, small fat-containing umbilical hernia, calcified atherosclerotic disease, a 1.3 cm cystic lesion in the right adnexa suggestive of a right ovarian cyst, atrophic pancreas, a 1.0 cm left adrenal nodule, small atelectasis.

## 2025-01-21 ENCOUNTER — RX RENEWAL (OUTPATIENT)
Age: 80
End: 2025-01-21

## 2025-01-30 NOTE — H&P ADULT - PROBLEM/PLAN-2
AFTER VISIT SUMMARY (AVS):    At today's visit we thoroughly discussed various diagnostic possibilities for your symptoms, necessary evaluation, and the plan, which includes:  Orders Placed This Encounter   Procedures    Vitamin B6    Vitamin B12    Vitamin B1 whole blood    TSH with free T4 reflex    Methylmalonic Acid    [facility]; [id number]; Phosphorylated tau 181 (Laboratory Miscellaneous Order)    [facility]; ; Phosphorylated tau 217 (Laboratory Miscellaneous Order)    Other Laboratory; [facility]; Beta amyloid 42/40 ratio, [place] [address] (Laboratory Miscellaneous Order)    Occupational Therapy  Referral     No new medications.    Stay physically and mentally active with particular emphasis on daily mentally stimulating activities of your choice (such as crosswords, puzzles, sudoku, etc.), Mediterranean diet, strenuous aerobic exercises 30 to 60 minutes/day 5 to 7 days/week, and increased social interaction.     Additional recommendations after the work-up.    Next follow-up appointment is in the next 4-6 weeks or earlier if needed.    Please do not hesitate to call me with any questions or concerns.    Thanks.    DISPLAY PLAN FREE TEXT

## 2025-04-04 ENCOUNTER — APPOINTMENT (OUTPATIENT)
Dept: GASTROENTEROLOGY | Facility: CLINIC | Age: 80
End: 2025-04-04
Payer: MEDICARE

## 2025-04-04 VITALS
HEART RATE: 64 BPM | OXYGEN SATURATION: 96 % | WEIGHT: 168 LBS | HEIGHT: 63 IN | SYSTOLIC BLOOD PRESSURE: 134 MMHG | DIASTOLIC BLOOD PRESSURE: 75 MMHG | BODY MASS INDEX: 29.77 KG/M2 | TEMPERATURE: 96.4 F

## 2025-04-04 DIAGNOSIS — R10.13 EPIGASTRIC PAIN: ICD-10-CM

## 2025-04-04 DIAGNOSIS — K29.30 CHRONIC SUPERFICIAL GASTRITIS W/OUT BLEEDING: ICD-10-CM

## 2025-04-04 DIAGNOSIS — K64.8 OTHER HEMORRHOIDS: ICD-10-CM

## 2025-04-04 DIAGNOSIS — K59.00 CONSTIPATION, UNSPECIFIED: ICD-10-CM

## 2025-04-04 DIAGNOSIS — K21.9 GASTRO-ESOPHAGEAL REFLUX DISEASE W/OUT ESOPHAGITIS: ICD-10-CM

## 2025-04-04 DIAGNOSIS — K44.9 DIAPHRAGMATIC HERNIA W/OUT OBSTRUCTION OR GANGRENE: ICD-10-CM

## 2025-04-04 DIAGNOSIS — K57.30 DIVERTICULOSIS OF LARGE INTESTINE W/OUT PERFORATION OR ABSCESS W/OUT BLEEDING: ICD-10-CM

## 2025-04-04 PROCEDURE — 99214 OFFICE O/P EST MOD 30 MIN: CPT

## 2025-04-04 RX ORDER — SIMETHICONE 180 MG
180 CAPSULE ORAL 4 TIMES DAILY
Qty: 120 | Refills: 3 | Status: ACTIVE | COMMUNITY
Start: 2025-04-04 | End: 1900-01-01

## 2025-07-01 ENCOUNTER — RX RENEWAL (OUTPATIENT)
Age: 80
End: 2025-07-01

## 2025-09-16 ENCOUNTER — RESULT REVIEW (OUTPATIENT)
Age: 80
End: 2025-09-16

## (undated) DEVICE — NDL INJ SCLERO INTERJECT 23G

## (undated) DEVICE — KIT ENDO PROCEDURE CUST W/VLV

## (undated) DEVICE — BITE BLOCK ADULT 20 X 27MM (GREEN)

## (undated) DEVICE — TUBING MEDI-VAC W MAXIGRIP CONNECTORS 1/4"X6'

## (undated) DEVICE — ADAPTER ENDO CHNL SINGLE USE

## (undated) DEVICE — FORCEP RADIAL JAW 4 W NDL 2.2MM 2.8MM 240CM ORANGE DISP

## (undated) DEVICE — SYR LUER LOK 50CC

## (undated) DEVICE — LUBRICATING JELLY ONESHOT 1.25OZ

## (undated) DEVICE — VENODYNE/SCD SLEEVE CALF MEDIUM

## (undated) DEVICE — RETRIEVER ROTH NET PLATINUM-UNIVERSAL

## (undated) DEVICE — TUBING CANNULA SALTER LABS NASAL ADULT 7FT

## (undated) DEVICE — CATH ELCTR GLIDE PRB 7FR

## (undated) DEVICE — SENSOR O2 FINGER ADULT

## (undated) DEVICE — STERIS DEFENDO 3-PIECE KIT (AIR/WATER, SUCTION & BIOPSY VALVES)

## (undated) DEVICE — SNARE LOOP POLY DISP 30MM LOOP

## (undated) DEVICE — DRSG CURITY GAUZE SPONGE 4 X 4" 12-PLY

## (undated) DEVICE — SOL INJ NS 0.9% 500ML 1-PORT

## (undated) DEVICE — CLAMP BX HOT RAD JAW 3

## (undated) DEVICE — FORCEP BIOPSY 2.5MM DISP

## (undated) DEVICE — TUBING IV SET GRAVITY 3Y 100" MACRO

## (undated) DEVICE — CONTAINER FORMALIN 10% 20ML